# Patient Record
Sex: FEMALE | Race: WHITE | NOT HISPANIC OR LATINO | Employment: UNEMPLOYED | URBAN - NONMETROPOLITAN AREA
[De-identification: names, ages, dates, MRNs, and addresses within clinical notes are randomized per-mention and may not be internally consistent; named-entity substitution may affect disease eponyms.]

---

## 2017-02-09 ENCOUNTER — OFFICE VISIT (OUTPATIENT)
Dept: OTOLARYNGOLOGY | Facility: CLINIC | Age: 52
End: 2017-02-09

## 2017-02-09 ENCOUNTER — PROCEDURE VISIT (OUTPATIENT)
Dept: OTOLARYNGOLOGY | Facility: CLINIC | Age: 52
End: 2017-02-09

## 2017-02-09 VITALS
HEIGHT: 65 IN | WEIGHT: 128 LBS | HEART RATE: 76 BPM | TEMPERATURE: 97.9 F | BODY MASS INDEX: 21.33 KG/M2 | DIASTOLIC BLOOD PRESSURE: 68 MMHG | SYSTOLIC BLOOD PRESSURE: 124 MMHG

## 2017-02-09 DIAGNOSIS — H90.11 CONDUCTIVE HEARING LOSS IN RIGHT EAR: ICD-10-CM

## 2017-02-09 DIAGNOSIS — H72.01 CENTRAL PERFORATION OF TYMPANIC MEMBRANE, RIGHT EAR: Primary | ICD-10-CM

## 2017-02-09 DIAGNOSIS — H72.91 EAR DRUM PERFORATION, RIGHT: Primary | ICD-10-CM

## 2017-02-09 DIAGNOSIS — H90.5 SENSORINEURAL HEARING LOSS OF LEFT EAR: ICD-10-CM

## 2017-02-09 DIAGNOSIS — H92.11 OTORRHEA, RIGHT EAR: ICD-10-CM

## 2017-02-09 PROCEDURE — 99204 OFFICE O/P NEW MOD 45 MIN: CPT | Performed by: NURSE PRACTITIONER

## 2017-02-09 RX ORDER — CIPROFLOXACIN AND DEXAMETHASONE 3; 1 MG/ML; MG/ML
4 SUSPENSION/ DROPS AURICULAR (OTIC) 2 TIMES DAILY
Qty: 7.5 ML | Refills: 0 | Status: SHIPPED | OUTPATIENT
Start: 2017-02-09 | End: 2017-02-19

## 2017-02-09 RX ORDER — AMOXICILLIN AND CLAVULANATE POTASSIUM 875; 125 MG/1; MG/1
1 TABLET, FILM COATED ORAL 2 TIMES DAILY
Qty: 20 TABLET | Refills: 0 | Status: SHIPPED | OUTPATIENT
Start: 2017-02-09 | End: 2017-02-19

## 2017-02-09 NOTE — PROGRESS NOTES
PRIMARY CARE PROVIDER: Delfino Bennett MD  REFERRING PROVIDER: No ref. provider found    Chief Complaint   Patient presents with   • Ear Problem     Right ear drum perforation/infection       Subjective   History of Present Illness:  Claudette Ray is a  51 y.o.  female who complains of otorrhea, a current ear infection, an eardrum perforation and a history of ear surgery. The symptoms are localized to the right ear. The patient has had moderate symptoms. The symptoms have been relatively constant for the last several months . The symptoms are aggravated by  no identifiable factors . The symptoms are improved by no identifieable factors. The patient has a history of 3 tympanoplasty surgeries done in [place]. She states the surgeries have all failed. Today she complains of right ear pain, drainage, and decreased hearing. She was treated with antibiotic, steroid, and drops in a walk-in clinic a couple of months ago that gave her mild relief.      Review of Systems:  Review of Systems   Constitutional: Negative for chills and fever.   HENT:        See HPI   Eyes: Negative for redness and itching.   Respiratory: Negative for cough and shortness of breath.    Cardiovascular: Negative.    Gastrointestinal: Negative for nausea and vomiting.   Allergic/Immunologic: Negative.    Neurological: Positive for headaches. Negative for dizziness.   Hematological: Does not bruise/bleed easily.   Psychiatric/Behavioral: Negative.        Past History:  History reviewed. No pertinent past medical history.  Past Surgical History   Procedure Laterality Date   • Tympanoplasty Right      x3 1996/2004/2008   • Eye surgery     • Hernia repair     • Wrist surgery       Family History   Problem Relation Age of Onset   • Diabetes Mother    • Heart disease Father      Social History   Substance Use Topics   • Smoking status: Current Every Day Smoker     Packs/day: 0.50     Types: Cigarettes   • Smokeless tobacco: None   • Alcohol use 1.2 oz/week      2 Glasses of wine per week       Current Outpatient Prescriptions:   •  Multiple Vitamins-Minerals (MULTIVITAMIN PO), Take  by mouth., Disp: , Rfl:   •  amoxicillin-clavulanate (AUGMENTIN) 875-125 MG per tablet, Take 1 tablet by mouth 2 (Two) Times a Day for 10 days., Disp: 20 tablet, Rfl: 0  •  ciprofloxacin-dexamethasone (CIPRODEX) 0.3-0.1 % otic suspension, Administer 4 drops into ears 2 (Two) Times a Day for 10 days., Disp: 7.5 mL, Rfl: 0  Allergies:  Review of patient's allergies indicates no known allergies.    Objective     Vital Signs:  Temp:  [97.9 °F (36.6 °C)] 97.9 °F (36.6 °C)  Heart Rate:  [76] 76  BP: (124)/(68) 124/68    Physical Exam:  CONSTITUTIONAL: well nourished, well-developed, alert, oriented, in no acute distress   COMMUNICATION AND VOICE: able to communicate normally, normal voice quality  HEAD: normocephalic, no lesions, atraumatic, no tenderness, no masses   FACE: appearance normal, no lesions, no tenderness, no deformities, facial motion symmetric  SALIVARY GLANDS: parotid glands with no tenderness, no swelling, no masses, submandibular glands with normal size, nontender  EYES: ocular motility normal, eyelids normal, orbits normal, no proptosis, conjunctiva normal , pupils equal, round   EARS:  Hearing: response to conversational voice normal bilaterally   External Ears: auricles without lesions  Otoscopic Exam:   EXTERNAL CANAL: normal ear canal without stenosis or significant cerumen   RIGHT TYMPANIC MEMBRANE: large central perforation present with thick, white drainage present; there is mild erythema and inflammation posteriorly  LEFT TYMPANIC MEMBRANE:  tympanic membrane appearance normal, no lesions, no perforation, normal mobility, no fluid  NOSE:  External Nose: structure normal, no tenderness on palpation, clear nasal discharge, no lesions, no evidence of trauma, nostrils patent   Intranasal Exam: nasal mucosa normal, vestibule within normal limits, inferior turbinate normal,  nasal septum midline   ORAL:  Lips: upper and lower lips without lesion   Teeth: dentition within normal limits for age   Gums: gingivae healthy   Oral Mucosa: oral mucosa normal, no mucosal lesions   Floor of Mouth: Warthin’s duct patent, mucosa normal  Tongue: lingual mucosa normal without lesions, normal tongue mobility   Palate: soft and hard palates with normal mucosa and structure  Oropharynx: oropharyngeal mucosa normal, tonsils normal in appearance   NECK: neck appearance normal, no masses or tenderness  THYROID: no overt thyromegaly, no tenderness, nodules or mass present on palpation, position midline   LYMPH NODES: no lymphadenopathy  CHEST/RESPIRATORY: respiratory effort normal, normal breath sounds   CARDIOVASCULAR: rate and rhythm normal, extremities without cyanosis or edema    NEUROLOGIC/PSYCHIATRIC: oriented to time, place and person, mood normal, affect appropriate, CN II-XII intact grossly    Results Review:   Audio reviewed:                       Assessment   1. Central perforation of tympanic membrane, right ear    2. Conductive hearing loss in right ear    3. Sensorineural hearing loss of left ear    4. Otorrhea, right ear        Plan   Continue current management plan. We discussed that she may need another surgical procedure; however, at this time we will treat with drops to clear the drainage and have her return in 2-3 months with Dr. Francisco. Patient is to have records sent to our office.     -------MEDICATIONS:-------  New Medications Ordered This Visit   Medications   • amoxicillin-clavulanate (AUGMENTIN) 875-125 MG per tablet     Sig: Take 1 tablet by mouth 2 (Two) Times a Day for 10 days.     Dispense:  20 tablet     Refill:  0   • ciprofloxacin-dexamethasone (CIPRODEX) 0.3-0.1 % otic suspension     Sig: Administer 4 drops into ears 2 (Two) Times a Day for 10 days.     Dispense:  7.5 mL     Refill:  0           -----FOLLOW UP-----  Return in about 2 months (around 4/9/2017) for Recheck  ear with Dr. Francisco.    Marcela Ventura, APRN  02/09/17  1:02 PM

## 2017-04-13 ENCOUNTER — OFFICE VISIT (OUTPATIENT)
Dept: OTOLARYNGOLOGY | Facility: CLINIC | Age: 52
End: 2017-04-13

## 2017-04-13 VITALS
TEMPERATURE: 97.1 F | WEIGHT: 127 LBS | DIASTOLIC BLOOD PRESSURE: 75 MMHG | BODY MASS INDEX: 21.16 KG/M2 | HEART RATE: 65 BPM | SYSTOLIC BLOOD PRESSURE: 137 MMHG | HEIGHT: 65 IN

## 2017-04-13 DIAGNOSIS — H90.11 CONDUCTIVE HEARING LOSS IN RIGHT EAR: ICD-10-CM

## 2017-04-13 DIAGNOSIS — H72.01 CENTRAL PERFORATION OF TYMPANIC MEMBRANE, RIGHT EAR: Primary | ICD-10-CM

## 2017-04-13 DIAGNOSIS — H92.11 OTORRHEA, RIGHT EAR: ICD-10-CM

## 2017-04-13 PROCEDURE — 92504 EAR MICROSCOPY EXAMINATION: CPT | Performed by: OTOLARYNGOLOGY

## 2017-04-13 PROCEDURE — 99214 OFFICE O/P EST MOD 30 MIN: CPT | Performed by: OTOLARYNGOLOGY
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
## 2017-04-26 ENCOUNTER — APPOINTMENT (OUTPATIENT)
Dept: PREADMISSION TESTING | Facility: HOSPITAL | Age: 52
End: 2017-04-26

## 2017-05-26 ENCOUNTER — APPOINTMENT (OUTPATIENT)
Dept: PREADMISSION TESTING | Facility: HOSPITAL | Age: 52
End: 2017-05-26

## 2017-05-26 ENCOUNTER — HOSPITAL ENCOUNTER (OUTPATIENT)
Dept: GENERAL RADIOLOGY | Facility: HOSPITAL | Age: 52
Discharge: HOME | End: 2017-05-26
Admitting: OTOLARYNGOLOGY

## 2017-05-26 VITALS
HEART RATE: 77 BPM | WEIGHT: 127 LBS | BODY MASS INDEX: 21.16 KG/M2 | DIASTOLIC BLOOD PRESSURE: 65 MMHG | HEIGHT: 65 IN | OXYGEN SATURATION: 99 % | SYSTOLIC BLOOD PRESSURE: 119 MMHG | RESPIRATION RATE: 18 BRPM

## 2017-05-26 LAB
ALBUMIN SERPL-MCNC: 4.5 G/DL (ref 3.5–5)
ALBUMIN/GLOB SERPL: 1.6 G/DL (ref 1.1–2.5)
ALP SERPL-CCNC: 66 U/L (ref 24–120)
ALT SERPL W P-5'-P-CCNC: 60 U/L (ref 0–54)
ANION GAP SERPL CALCULATED.3IONS-SCNC: 12 MMOL/L (ref 4–13)
AST SERPL-CCNC: 49 U/L (ref 7–45)
BILIRUB SERPL-MCNC: 0.5 MG/DL (ref 0.1–1)
BUN BLD-MCNC: 10 MG/DL (ref 5–21)
BUN/CREAT SERPL: 14.7 (ref 7–25)
CALCIUM SPEC-SCNC: 9.5 MG/DL (ref 8.4–10.4)
CHLORIDE SERPL-SCNC: 102 MMOL/L (ref 98–110)
CO2 SERPL-SCNC: 27 MMOL/L (ref 24–31)
CREAT BLD-MCNC: 0.68 MG/DL (ref 0.5–1.4)
DEPRECATED RDW RBC AUTO: 44.3 FL (ref 40–54)
ERYTHROCYTE [DISTWIDTH] IN BLOOD BY AUTOMATED COUNT: 12.4 % (ref 12–15)
GFR SERPL CREATININE-BSD FRML MDRD: 91 ML/MIN/1.73
GLOBULIN UR ELPH-MCNC: 2.8 GM/DL
GLUCOSE BLD-MCNC: 100 MG/DL (ref 70–100)
HCT VFR BLD AUTO: 42.7 % (ref 37–47)
HGB BLD-MCNC: 14.4 G/DL (ref 12–16)
MCH RBC QN AUTO: 32.8 PG (ref 28–32)
MCHC RBC AUTO-ENTMCNC: 33.7 G/DL (ref 33–36)
MCV RBC AUTO: 97.3 FL (ref 82–98)
PLATELET # BLD AUTO: 311 10*3/MM3 (ref 130–400)
PMV BLD AUTO: 10.3 FL (ref 6–12)
POTASSIUM BLD-SCNC: 4.3 MMOL/L (ref 3.5–5.3)
PROT SERPL-MCNC: 7.3 G/DL (ref 6.3–8.7)
RBC # BLD AUTO: 4.39 10*6/MM3 (ref 4.2–5.4)
SODIUM BLD-SCNC: 141 MMOL/L (ref 135–145)
WBC NRBC COR # BLD: 6.01 10*3/MM3 (ref 4.8–10.8)

## 2017-05-26 PROCEDURE — 93005 ELECTROCARDIOGRAM TRACING: CPT

## 2017-05-26 PROCEDURE — 80053 COMPREHEN METABOLIC PANEL: CPT | Performed by: OTOLARYNGOLOGY

## 2017-05-26 PROCEDURE — 36415 COLL VENOUS BLD VENIPUNCTURE: CPT | Performed by: OTOLARYNGOLOGY

## 2017-05-26 PROCEDURE — 71010 HC CHEST PA OR AP: CPT

## 2017-05-26 PROCEDURE — 93010 ELECTROCARDIOGRAM REPORT: CPT | Performed by: INTERNAL MEDICINE

## 2017-05-26 PROCEDURE — 85027 COMPLETE CBC AUTOMATED: CPT | Performed by: OTOLARYNGOLOGY

## 2017-06-02 ENCOUNTER — ANESTHESIA (OUTPATIENT)
Dept: PERIOP | Facility: HOSPITAL | Age: 52
End: 2017-06-02

## 2017-06-02 ENCOUNTER — HOSPITAL ENCOUNTER (OUTPATIENT)
Facility: HOSPITAL | Age: 52
Setting detail: HOSPITAL OUTPATIENT SURGERY
Discharge: HOME | End: 2017-06-02
Attending: OTOLARYNGOLOGY | Admitting: OTOLARYNGOLOGY

## 2017-06-02 ENCOUNTER — ANESTHESIA EVENT (OUTPATIENT)
Dept: PERIOP | Facility: HOSPITAL | Age: 52
End: 2017-06-02

## 2017-06-02 VITALS
TEMPERATURE: 99.1 F | DIASTOLIC BLOOD PRESSURE: 61 MMHG | SYSTOLIC BLOOD PRESSURE: 108 MMHG | OXYGEN SATURATION: 96 % | RESPIRATION RATE: 16 BRPM | HEART RATE: 87 BPM

## 2017-06-02 DIAGNOSIS — H90.11 CONDUCTIVE HEARING LOSS IN RIGHT EAR: ICD-10-CM

## 2017-06-02 DIAGNOSIS — H92.11 OTORRHEA, RIGHT EAR: ICD-10-CM

## 2017-06-02 DIAGNOSIS — H72.01 CENTRAL PERFORATION OF TYMPANIC MEMBRANE, RIGHT EAR: ICD-10-CM

## 2017-06-02 PROCEDURE — 69450 EARDRUM REVISION: CPT | Performed by: OTOLARYNGOLOGY

## 2017-06-02 PROCEDURE — 25010000002 SUCCINYLCHOLINE PER 20 MG: Performed by: NURSE ANESTHETIST, CERTIFIED REGISTERED

## 2017-06-02 PROCEDURE — 25010000002 HYDROMORPHONE PER 4 MG: Performed by: ANESTHESIOLOGY

## 2017-06-02 PROCEDURE — 25010000002 MIDAZOLAM PER 1 MG: Performed by: ANESTHESIOLOGY

## 2017-06-02 PROCEDURE — 25010000002 EPINEPHRINE 1 MG/ML SOLUTION: Performed by: OTOLARYNGOLOGY

## 2017-06-02 PROCEDURE — 20926 PR REMV TISSUE FOR GRAFT OTHR: CPT | Performed by: OTOLARYNGOLOGY

## 2017-06-02 PROCEDURE — 25010000002 ONDANSETRON PER 1 MG: Performed by: NURSE ANESTHETIST, CERTIFIED REGISTERED

## 2017-06-02 PROCEDURE — 25010000002 ONDANSETRON PER 1 MG: Performed by: ANESTHESIOLOGY

## 2017-06-02 PROCEDURE — 25010000002 DEXAMETHASONE PER 1 MG: Performed by: NURSE ANESTHETIST, CERTIFIED REGISTERED

## 2017-06-02 PROCEDURE — 25010000002 PROPOFOL 10 MG/ML EMULSION: Performed by: NURSE ANESTHETIST, CERTIFIED REGISTERED

## 2017-06-02 PROCEDURE — 25010000002 DEXAMETHASONE PER 1 MG: Performed by: ANESTHESIOLOGY

## 2017-06-02 PROCEDURE — 69631 REPAIR EARDRUM STRUCTURES: CPT | Performed by: OTOLARYNGOLOGY

## 2017-06-02 RX ORDER — MEPERIDINE HYDROCHLORIDE 25 MG/ML
12.5 INJECTION INTRAMUSCULAR; INTRAVENOUS; SUBCUTANEOUS
Status: DISCONTINUED | OUTPATIENT
Start: 2017-06-02 | End: 2017-06-02 | Stop reason: HOSPADM

## 2017-06-02 RX ORDER — SUCCINYLCHOLINE CHLORIDE 20 MG/ML
INJECTION INTRAMUSCULAR; INTRAVENOUS AS NEEDED
Status: DISCONTINUED | OUTPATIENT
Start: 2017-06-02 | End: 2017-06-02 | Stop reason: SURG

## 2017-06-02 RX ORDER — BALANCED SALT SOLUTION 6.4; .75; .48; .3; 3.9; 1.7 MG/ML; MG/ML; MG/ML; MG/ML; MG/ML; MG/ML
SOLUTION OPHTHALMIC AS NEEDED
Status: DISCONTINUED | OUTPATIENT
Start: 2017-06-02 | End: 2017-06-02 | Stop reason: HOSPADM

## 2017-06-02 RX ORDER — LIDOCAINE HYDROCHLORIDE 20 MG/ML
INJECTION, SOLUTION INFILTRATION; PERINEURAL AS NEEDED
Status: DISCONTINUED | OUTPATIENT
Start: 2017-06-02 | End: 2017-06-02 | Stop reason: SURG

## 2017-06-02 RX ORDER — ONDANSETRON 2 MG/ML
INJECTION INTRAMUSCULAR; INTRAVENOUS AS NEEDED
Status: DISCONTINUED | OUTPATIENT
Start: 2017-06-02 | End: 2017-06-02 | Stop reason: SURG

## 2017-06-02 RX ORDER — SODIUM CHLORIDE, SODIUM LACTATE, POTASSIUM CHLORIDE, CALCIUM CHLORIDE 600; 310; 30; 20 MG/100ML; MG/100ML; MG/100ML; MG/100ML
9 INJECTION, SOLUTION INTRAVENOUS CONTINUOUS
Status: DISCONTINUED | OUTPATIENT
Start: 2017-06-02 | End: 2017-06-02 | Stop reason: HOSPADM

## 2017-06-02 RX ORDER — DEXAMETHASONE SODIUM PHOSPHATE 4 MG/ML
4 INJECTION, SOLUTION INTRA-ARTICULAR; INTRALESIONAL; INTRAMUSCULAR; INTRAVENOUS; SOFT TISSUE ONCE AS NEEDED
Status: COMPLETED | OUTPATIENT
Start: 2017-06-02 | End: 2017-06-02

## 2017-06-02 RX ORDER — HYDRALAZINE HYDROCHLORIDE 20 MG/ML
5 INJECTION INTRAMUSCULAR; INTRAVENOUS
Status: DISCONTINUED | OUTPATIENT
Start: 2017-06-02 | End: 2017-06-02 | Stop reason: HOSPADM

## 2017-06-02 RX ORDER — ONDANSETRON 2 MG/ML
4 INJECTION INTRAMUSCULAR; INTRAVENOUS ONCE AS NEEDED
Status: COMPLETED | OUTPATIENT
Start: 2017-06-02 | End: 2017-06-02

## 2017-06-02 RX ORDER — PROPOFOL 10 MG/ML
VIAL (ML) INTRAVENOUS AS NEEDED
Status: DISCONTINUED | OUTPATIENT
Start: 2017-06-02 | End: 2017-06-02 | Stop reason: SURG

## 2017-06-02 RX ORDER — MIDAZOLAM HYDROCHLORIDE 1 MG/ML
2 INJECTION INTRAMUSCULAR; INTRAVENOUS
Status: DISCONTINUED | OUTPATIENT
Start: 2017-06-02 | End: 2017-06-02 | Stop reason: HOSPADM

## 2017-06-02 RX ORDER — DEXAMETHASONE SODIUM PHOSPHATE 4 MG/ML
INJECTION, SOLUTION INTRA-ARTICULAR; INTRALESIONAL; INTRAMUSCULAR; INTRAVENOUS; SOFT TISSUE AS NEEDED
Status: DISCONTINUED | OUTPATIENT
Start: 2017-06-02 | End: 2017-06-02 | Stop reason: SURG

## 2017-06-02 RX ORDER — FENTANYL CITRATE 50 UG/ML
25 INJECTION, SOLUTION INTRAMUSCULAR; INTRAVENOUS
Status: DISCONTINUED | OUTPATIENT
Start: 2017-06-02 | End: 2017-06-02 | Stop reason: HOSPADM

## 2017-06-02 RX ORDER — OFLOXACIN 3 MG/ML
4 SOLUTION AURICULAR (OTIC) 2 TIMES DAILY
Qty: 10 ML | Refills: 0 | Status: SHIPPED | OUTPATIENT
Start: 2017-06-12 | End: 2017-07-31

## 2017-06-02 RX ORDER — BACITRACIN ZINC 500 [USP'U]/G
OINTMENT TOPICAL AS NEEDED
Status: DISCONTINUED | OUTPATIENT
Start: 2017-06-02 | End: 2017-06-02 | Stop reason: HOSPADM

## 2017-06-02 RX ORDER — LIDOCAINE HYDROCHLORIDE AND EPINEPHRINE 10; 10 MG/ML; UG/ML
INJECTION, SOLUTION INFILTRATION; PERINEURAL AS NEEDED
Status: DISCONTINUED | OUTPATIENT
Start: 2017-06-02 | End: 2017-06-02 | Stop reason: HOSPADM

## 2017-06-02 RX ORDER — SODIUM CHLORIDE 0.9 % (FLUSH) 0.9 %
1-10 SYRINGE (ML) INJECTION AS NEEDED
Status: DISCONTINUED | OUTPATIENT
Start: 2017-06-02 | End: 2017-06-02 | Stop reason: HOSPADM

## 2017-06-02 RX ORDER — EPINEPHRINE 1 MG/ML
INJECTION INTRAMUSCULAR; INTRAVENOUS; SUBCUTANEOUS AS NEEDED
Status: DISCONTINUED | OUTPATIENT
Start: 2017-06-02 | End: 2017-06-02 | Stop reason: HOSPADM

## 2017-06-02 RX ORDER — DIPHENHYDRAMINE HYDROCHLORIDE 50 MG/ML
12.5 INJECTION INTRAMUSCULAR; INTRAVENOUS
Status: DISCONTINUED | OUTPATIENT
Start: 2017-06-02 | End: 2017-06-02 | Stop reason: HOSPADM

## 2017-06-02 RX ORDER — MORPHINE SULFATE 2 MG/ML
2 INJECTION, SOLUTION INTRAMUSCULAR; INTRAVENOUS
Status: DISCONTINUED | OUTPATIENT
Start: 2017-06-02 | End: 2017-06-02 | Stop reason: HOSPADM

## 2017-06-02 RX ORDER — MIDAZOLAM HYDROCHLORIDE 1 MG/ML
1 INJECTION INTRAMUSCULAR; INTRAVENOUS
Status: DISCONTINUED | OUTPATIENT
Start: 2017-06-02 | End: 2017-06-02 | Stop reason: HOSPADM

## 2017-06-02 RX ORDER — MAGNESIUM HYDROXIDE 1200 MG/15ML
LIQUID ORAL AS NEEDED
Status: DISCONTINUED | OUTPATIENT
Start: 2017-06-02 | End: 2017-06-02 | Stop reason: HOSPADM

## 2017-06-02 RX ORDER — NALOXONE HCL 0.4 MG/ML
0.4 VIAL (ML) INJECTION AS NEEDED
Status: DISCONTINUED | OUTPATIENT
Start: 2017-06-02 | End: 2017-06-02 | Stop reason: HOSPADM

## 2017-06-02 RX ORDER — LABETALOL HYDROCHLORIDE 5 MG/ML
5 INJECTION, SOLUTION INTRAVENOUS
Status: DISCONTINUED | OUTPATIENT
Start: 2017-06-02 | End: 2017-06-02 | Stop reason: HOSPADM

## 2017-06-02 RX ORDER — DEXTROSE MONOHYDRATE 25 G/50ML
12.5 INJECTION, SOLUTION INTRAVENOUS AS NEEDED
Status: DISCONTINUED | OUTPATIENT
Start: 2017-06-02 | End: 2017-06-02 | Stop reason: HOSPADM

## 2017-06-02 RX ORDER — ROCURONIUM BROMIDE 10 MG/ML
INJECTION, SOLUTION INTRAVENOUS AS NEEDED
Status: DISCONTINUED | OUTPATIENT
Start: 2017-06-02 | End: 2017-06-02 | Stop reason: SURG

## 2017-06-02 RX ORDER — PHENYLEPHRINE HCL IN 0.9% NACL 0.8MG/10ML
SYRINGE (ML) INTRAVENOUS AS NEEDED
Status: DISCONTINUED | OUTPATIENT
Start: 2017-06-02 | End: 2017-06-02 | Stop reason: SURG

## 2017-06-02 RX ORDER — HYDROCODONE BITARTRATE AND ACETAMINOPHEN 5; 325 MG/1; MG/1
1 TABLET ORAL EVERY 6 HOURS PRN
Status: DISCONTINUED | OUTPATIENT
Start: 2017-06-02 | End: 2017-06-02 | Stop reason: HOSPADM

## 2017-06-02 RX ORDER — IPRATROPIUM BROMIDE AND ALBUTEROL SULFATE 2.5; .5 MG/3ML; MG/3ML
3 SOLUTION RESPIRATORY (INHALATION) ONCE AS NEEDED
Status: DISCONTINUED | OUTPATIENT
Start: 2017-06-02 | End: 2017-06-02 | Stop reason: HOSPADM

## 2017-06-02 RX ORDER — HYDROCODONE BITARTRATE AND ACETAMINOPHEN 5; 325 MG/1; MG/1
1 TABLET ORAL EVERY 4 HOURS PRN
Qty: 30 TABLET | Refills: 0 | Status: SHIPPED | OUTPATIENT
Start: 2017-06-02 | End: 2017-06-09

## 2017-06-02 RX ORDER — SUFENTANIL CITRATE 50 UG/ML
INJECTION EPIDURAL; INTRAVENOUS AS NEEDED
Status: DISCONTINUED | OUTPATIENT
Start: 2017-06-02 | End: 2017-06-02 | Stop reason: SURG

## 2017-06-02 RX ADMIN — SODIUM CHLORIDE, POTASSIUM CHLORIDE, SODIUM LACTATE AND CALCIUM CHLORIDE: 600; 310; 30; 20 INJECTION, SOLUTION INTRAVENOUS at 11:40

## 2017-06-02 RX ADMIN — LIDOCAINE HYDROCHLORIDE 1 ML: 10 INJECTION, SOLUTION EPIDURAL; INFILTRATION; INTRACAUDAL; PERINEURAL at 09:43

## 2017-06-02 RX ADMIN — MIDAZOLAM HYDROCHLORIDE 2 MG: 1 INJECTION, SOLUTION INTRAMUSCULAR; INTRAVENOUS at 10:16

## 2017-06-02 RX ADMIN — ONDANSETRON HYDROCHLORIDE 4 MG: 2 SOLUTION INTRAMUSCULAR; INTRAVENOUS at 12:38

## 2017-06-02 RX ADMIN — DEXAMETHASONE SODIUM PHOSPHATE 4 MG: 4 INJECTION, SOLUTION INTRAMUSCULAR; INTRAVENOUS at 09:44

## 2017-06-02 RX ADMIN — HYDROMORPHONE HYDROCHLORIDE 0.5 MG: 1 INJECTION, SOLUTION INTRAMUSCULAR; INTRAVENOUS; SUBCUTANEOUS at 13:15

## 2017-06-02 RX ADMIN — ROCURONIUM BROMIDE 5 MG: 10 INJECTION INTRAVENOUS at 10:46

## 2017-06-02 RX ADMIN — PROPOFOL 110 MG: 10 INJECTION, EMULSION INTRAVENOUS at 10:46

## 2017-06-02 RX ADMIN — SUCCINYLCHOLINE CHLORIDE 100 MG: 20 INJECTION, SOLUTION INTRAMUSCULAR; INTRAVENOUS at 10:46

## 2017-06-02 RX ADMIN — Medication 80 MCG: at 10:57

## 2017-06-02 RX ADMIN — HYDROMORPHONE HYDROCHLORIDE 0.5 MG: 1 INJECTION, SOLUTION INTRAMUSCULAR; INTRAVENOUS; SUBCUTANEOUS at 13:03

## 2017-06-02 RX ADMIN — SUFENTANIL CITRATE 50 MCG: 50 INJECTION, SOLUTION EPIDURAL; INTRAVENOUS at 10:46

## 2017-06-02 RX ADMIN — HYDROMORPHONE HYDROCHLORIDE 0.5 MG: 1 INJECTION, SOLUTION INTRAMUSCULAR; INTRAVENOUS; SUBCUTANEOUS at 13:21

## 2017-06-02 RX ADMIN — SODIUM CHLORIDE, POTASSIUM CHLORIDE, SODIUM LACTATE AND CALCIUM CHLORIDE 9 ML/HR: 600; 310; 30; 20 INJECTION, SOLUTION INTRAVENOUS at 13:21

## 2017-06-02 RX ADMIN — LIDOCAINE HYDROCHLORIDE 100 MG: 20 INJECTION, SOLUTION INFILTRATION; PERINEURAL at 10:46

## 2017-06-02 RX ADMIN — HYDROMORPHONE HYDROCHLORIDE 0.5 MG: 1 INJECTION, SOLUTION INTRAMUSCULAR; INTRAVENOUS; SUBCUTANEOUS at 13:08

## 2017-06-02 RX ADMIN — EPHEDRINE SULFATE 15 MG: 50 INJECTION INTRAMUSCULAR; INTRAVENOUS; SUBCUTANEOUS at 11:25

## 2017-06-02 RX ADMIN — Medication 160 MCG: at 11:21

## 2017-06-02 RX ADMIN — SODIUM CHLORIDE, POTASSIUM CHLORIDE, SODIUM LACTATE AND CALCIUM CHLORIDE 9 ML/HR: 600; 310; 30; 20 INJECTION, SOLUTION INTRAVENOUS at 09:43

## 2017-06-02 RX ADMIN — EPHEDRINE SULFATE 10 MG: 50 INJECTION INTRAMUSCULAR; INTRAVENOUS; SUBCUTANEOUS at 11:39

## 2017-06-02 RX ADMIN — ONDANSETRON 4 MG: 2 INJECTION INTRAMUSCULAR; INTRAVENOUS at 13:03

## 2017-06-02 RX ADMIN — DEXAMETHASONE SODIUM PHOSPHATE 4 MG: 4 INJECTION, SOLUTION INTRAMUSCULAR; INTRAVENOUS at 12:38

## 2017-06-02 RX ADMIN — Medication 80 MCG: at 11:08

## 2017-06-02 RX ADMIN — Medication 160 MCG: at 10:50

## 2017-06-02 NOTE — ANESTHESIA PREPROCEDURE EVALUATION
Anesthesia Evaluation     Patient summary reviewed   no history of anesthetic complications:  NPO Solid Status: > 8 hours       Airway   Mallampati: I  TM distance: >3 FB  Neck ROM: full  Dental      Pulmonary    (+) a smoker,   (-) COPD, asthma, sleep apnea  Cardiovascular - negative cardio ROS  Exercise tolerance: excellent (>7 METS)    ECG reviewed        Neuro/Psych  (-) seizures, CVA  GI/Hepatic/Renal/Endo    (-) GERD, liver disease, renal disease, diabetes    Musculoskeletal     Abdominal    Substance History      OB/GYN          Other                                        Anesthesia Plan    ASA 2     general     intravenous induction   Anesthetic plan and risks discussed with patient.

## 2017-06-02 NOTE — ANESTHESIA PROCEDURE NOTES
Airway  Urgency: elective    Airway not difficult    General Information and Staff    Patient location during procedure: OR  CRNA: ANTONIO BRIGGS    Indications and Patient Condition  Indications for airway management: airway protection    Preoxygenated: yes  Mask difficulty assessment: 1 - vent by mask    Final Airway Details  Final airway type: endotracheal airway      Successful airway: ETT  Cuffed: yes   Successful intubation technique: direct laryngoscopy  Facilitating devices/methods: intubating stylet  Endotracheal tube insertion site: oral  Blade: Kurtz  Blade size: #2  ETT size: 7.0 mm  Cormack-Lehane Classification: grade I - full view of glottis  Placement verified by: chest auscultation and capnometry   Cuff volume (mL): 8  Measured from: teeth  ETT to teeth (cm): 20  Number of attempts at approach: 1

## 2017-06-02 NOTE — OP NOTE
Operative Note    Claudette Ray  6/2/2017    Pre-op Diagnosis:   Conductive hearing loss in right ear [H90.11]  Central perforation of tympanic membrane, right ear [H72.01]  Otorrhea, right ear [H92.11]    Post-op Diagnosis:     Post-Op Diagnosis Codes:     * Conductive hearing loss in right ear [H90.11]     * Central perforation of tympanic membrane, right ear [H72.01]     * Otorrhea, right ear [H92.11]    Procedure/CPT® Codes:  TN TYMPANOPLASTY [88232]  TN EARDRUM REVISION [01369]  TN MICROSURG TECHNIQUES,REQ OPER MICROSCOPE [49651]    Procedure(s):  right tympanoplasty, tympanolysis of adhesions, use of the operative microscope, facial nerve monitoring, tragal cartilaginous grafting    Surgeon(s):  Delfino Francisco MD    Anesthesia: General    Staff:   Circulator: Chayo Chu RN  Scrub Person: Kushal Grimes  Other: Marybel Malik; Anny Schaeffer; Mary Ardon    Estimated Blood Loss:   minimal    Specimens:                None      Drains:   none    Findings:   There is a 40% perforation in the inferior aspect of the tympanic membrane.  It was adhesed down to the incudostapedial joint and the promontory superiorly.  There is no evidence of cholesteatoma.  There was mobility of the ossicular chain after removal of adhesions.    Complications:   none    Reason for the Operation:  Claudette Ray is a 51 y.o. female with a history of a right-sided tympanic membrane perforation.  She was found to have conductive hearing loss and has had chronic otorrhea.  She has had at least 2 or 3 separate ear surgeries on that ear in the past.  I felt that she would require a revision tympanoplasty with takedown of adhesions.  After understanding the risks, benefits and alternatives, a consent for the operation was given.     Procedure Description:  The patient was taken back to the operating room, placed supine on the operating table and placed under anesthesia by the anesthesia staff. Once this was done a time  out was performed to confirm the patient and the proper procedure. Once this was done, the table was turned 180° to facilitate acess the head.  The [employer] Nerve Integrity Monitor (NIM) was set up to monitor both the upper and lower divisions of the facial nerve by placing leads in the orbicularis occuli and orbicularis oris muscles.  Leads were placed into the subcutaneous muscle layer. Ground and reference leads were placed. The NIM was then tested and found to be in appropriate working order with separate tones for all divisions. After sterile prep, a handheld probe was attached and used for verifying nerve integrity when needed. The right ear was prepped and draped in the usual otologic fashion.  Mastisol and 10 x 10's were placed around the ear to protect the hair from the wound.  The areas were injected with 1% lidocaine with 1 100,000 parts adrenaline.  The patient was then prepped and draped in the standard fashion.  Using the 0° otologic telescope, the ear canal was visualized and cleaned of its cerumen.  The above-mentioned findings of the perforation were noted. The microscope was used to visualize the external auditory canal and vascular strip injections were performed.  Once this was done, a tragal perichondrial and cartilage graft was harvested by making an incision at the tip of the tragus and carrying it down to the posterior perichondrium.  Soft tissue was elevated off of it in the perichondrium was then harvested.  I then used a 4 mm punch to harvest cartilage from the tragal cartilage itself.  I then irrigated and closed the wound with 5-0 fast absorbing plain gut suture.  The perichondrium was spread on a Judson block for drying.  Once this was done, I went back under the microscope and raised a tympanomeatal flap making an incision approximately 3-4 mm distal to the annulus.  There is a lot of scarring in this area especially superiorly.  I entered in the middle ear spaced and used a gimmick to  elevate the annulus.  Superiorly, there was a lot of scarring at the head of the malleus.  There also was adhesions of the superior aspect of the perforation to the promontory and the incudostapedial joint region.  I found the chorda tympani nerve and maintained it as it coursed underneath the malleus.  I lifted the eardrum off of the malleus sharply with a sickle knife.  At the level of the inferior aspect of the stapes, I lifted the eardrum off and then took it off of the promontory.  I aired on removing some of the mucosa of the middle ear space to make sure that there was no squamous tissue that had grown onto it.  I then had the eardrum completely up.  I trimmed the perforation with a Bellucci scissors to remove both the atrophic portion of it and also the area that could be middle ear mucosa rather than squamous tissue.  After this was done I placed Gelfoam with 1-10,000 parts adrenaline in the middle ear space for hemostasis.  I removed this and then packed the middle ear space with Gelfoam with saline.  I then thinned out the cartilage and placed in the posterior inferior aspect of the eardrum region.  I then placed the fascial graft over top of this and made sure it was tucked underneath the tympanomeatal flap.  I returned the tympanomeatal flap to a normal position.  I packed Gelfoam on the outside.  I then placed bacitracin ointment in the ear canal.  The patient was then turned over to the anesthesia team and allowed to wake from anesthesia. The patient was transported to the recovery room in a stable condition.       Delfino Francisco MD     Date: 6/2/2017  Time: 2:23 PM

## 2017-06-02 NOTE — DISCHARGE INSTRUCTIONS

## 2017-06-02 NOTE — PLAN OF CARE
Problem: Patient Care Overview (Adult)  Goal: Plan of Care Review  Outcome: Outcome(s) achieved Date Met:  06/02/17 06/02/17 1442   Coping/Psychosocial Response Interventions   Plan Of Care Reviewed With patient;family   Patient Care Overview   Progress improving   Outcome Evaluation   Outcome Summary/Follow up Plan patient meets discharge criteria         Problem: Perioperative Period (Adult)  Goal: Signs and Symptoms of Listed Potential Problems Will be Absent or Manageable (Perioperative Period)  Outcome: Outcome(s) achieved Date Met:  06/02/17

## 2017-06-02 NOTE — H&P
PRIMARY CARE PROVIDER: Delfino Bennett MD  REFERRING PROVIDER: Delfino Francisco MD    CHIEF COMPLAINT:  Preoperative evaluation for surgery    Subjective   History of Present Illness:  Claudette Ray is a  51 y.o.  female who who is here for follow up. She is scheduled for right tympanoplasty. There has been no significant change in the history since the preoperative office evaluation.     Review of Systems:  CONSTITUTIONAL: no fever or chills  PULMONARY: no cough or shortness of breath  GI: no nausea or vomiting    Past History:  Past Medical History:   Diagnosis Date   • History of occasional ear infections      Past Surgical History:   Procedure Laterality Date   • EYE SURGERY     • EYE SURGERY Bilateral     X2   • HERNIA REPAIR     • TYMPANOPLASTY Right     x3 1996/2004/2008   • WRIST SURGERY       Family History   Problem Relation Age of Onset   • Diabetes Mother    • Heart disease Father      Social History   Substance Use Topics   • Smoking status: Current Every Day Smoker     Packs/day: 0.50     Types: Cigarettes   • Smokeless tobacco: None   • Alcohol use 1.2 oz/week     2 Glasses of wine per week       Current Facility-Administered Medications:   •  dextrose (D50W) solution 12.5 g, 12.5 g, Intravenous, PRN, Marcio Kong MD  •  fentaNYL citrate (PF) (SUBLIMAZE) injection 25 mcg, 25 mcg, Intravenous, Q5 Min PRN, Marcio Kong MD  •  lactated ringers infusion, 9 mL/hr, Intravenous, Continuous, Marcio Kong MD, Last Rate: 9 mL/hr at 06/02/17 0943, 9 mL/hr at 06/02/17 0943  •  midazolam (VERSED) injection 1 mg, 1 mg, Intravenous, Q5 Min PRN **OR** midazolam (VERSED) injection 2 mg, 2 mg, Intravenous, Q5 Min PRN, Marcio Kong MD, 2 mg at 06/02/17 0944  •  sodium chloride 0.9 % flush 1-10 mL, 1-10 mL, Intravenous, PRN, Marcio Kong MD  Allergies:  Review of patient's allergies indicates no known allergies.    Objective     Vital Signs:  Temp:   [97.7 °F (36.5 °C)] 97.7 °F (36.5 °C)  Heart Rate:  [76] 76  Resp:  [16] 16  BP: (116)/(66) 116/66    Physical Exam:  CONSTITUTIONAL: well nourished, well-developed, alert, oriented, in no acute distress   COMMUNICATION AND VOICE: able to communicate normally, normal voice quality  HEAD: normocephalic, no lesions, atraumatic, no tenderness, no masses   FACE: appearance normal, no lesions, no tenderness, no deformities, facial motion symmetric  EYES: ocular motility normal, eyelids normal, orbits normal, no proptosis, conjunctiva normal , pupils equal, round   EARS:  Hearing: hearing to conversational voice intact bilaterally   External Ears: normal bilaterally, no lesions  NOSE:  External Nose: external nasal structure normal, no tenderness on palpation, no nasal discharge, no lesions, no evidence of trauma, nostrils patent   ORAL:  Lips: upper and lower lips without lesion   NECK:  Inspection and Palpation: neck appearance normal, no masses or tenderness  CHEST/RESPIRATORY: normal respiratory effort   CARDIOVASCULAR: no cyanosis or edema   NEUROLOGICAL/PSYCHIATRIC: oriented to time, place and person, mood normal, affect appropriate, CN II-XII intact grossly      Assessment   ASSESSMENT:  1. Central perforation of tympanic membrane, right ear    2. Conductive hearing loss in right ear    3. Otorrhea, right ear        Plan   PLAN:  right tympanoplasty  The risks and benefits have been re-discussed and questions answered    Delfino Francisco MD  06/02/17  10:05 AM

## 2017-06-02 NOTE — ANESTHESIA POSTPROCEDURE EVALUATION
Patient: Claudette Ray    Procedure Summary     Date Anesthesia Start Anesthesia Stop Room / Location    06/02/17 1044 1252  [facility] OR 02 / [facility] OR       Procedure Diagnosis Surgeon Provider    right tympanoplasty, tympanolysis of adhesions, use of the operative microscope, facial nerve monitoring, tragal cartilaginous grafting (Right Ear) Conductive hearing loss in right ear; Central perforation of tympanic membrane, right ear; Otorrhea, right ear  (Conductive hearing loss in right ear [H90.11]; Central perforation of tympanic membrane, right ear [H72.01]; Otorrhea, right ear [H92.11]) MD Akira Hines CRNA          Anesthesia Type: general  Last vitals  BP      Temp      Pulse     Resp      SpO2        Post Anesthesia Care and Evaluation      Comments: Patient discharged from PACU per protocol, VSS.     Blood pressure 108/61, pulse 87, temperature 99.1 °F (37.3 °C), resp. rate 16, SpO2 96 %.

## 2017-06-02 NOTE — PLAN OF CARE
Problem: Patient Care Overview (Adult)  Goal: Plan of Care Review  Outcome: Ongoing (interventions implemented as appropriate)    06/02/17 0936   Coping/Psychosocial Response Interventions   Plan Of Care Reviewed With patient   Patient Care Overview   Progress no change         Problem: Perioperative Period (Adult)  Goal: Signs and Symptoms of Listed Potential Problems Will be Absent or Manageable (Perioperative Period)  Outcome: Ongoing (interventions implemented as appropriate)    06/02/17 0936   Perioperative Period   Problems Assessed (Perioperative Period) hypothermia;physiologic stress response;situational response   Problems Present (Perioperative Period) none

## 2017-06-29 ENCOUNTER — OFFICE VISIT (OUTPATIENT)
Dept: OTOLARYNGOLOGY | Facility: CLINIC | Age: 52
End: 2017-06-29

## 2017-06-29 VITALS
RESPIRATION RATE: 20 BRPM | SYSTOLIC BLOOD PRESSURE: 115 MMHG | TEMPERATURE: 98 F | HEART RATE: 84 BPM | DIASTOLIC BLOOD PRESSURE: 65 MMHG | BODY MASS INDEX: 19.16 KG/M2 | WEIGHT: 115 LBS | HEIGHT: 65 IN

## 2017-06-29 DIAGNOSIS — H90.5 SENSORINEURAL HEARING LOSS OF LEFT EAR: ICD-10-CM

## 2017-06-29 DIAGNOSIS — Z72.0 TOBACCO ABUSE DISORDER: ICD-10-CM

## 2017-06-29 DIAGNOSIS — Z98.890 S/P TYMPANOPLASTY: Primary | ICD-10-CM

## 2017-06-29 DIAGNOSIS — H90.11 CONDUCTIVE HEARING LOSS IN RIGHT EAR: ICD-10-CM

## 2017-06-29 PROCEDURE — 99024 POSTOP FOLLOW-UP VISIT: CPT | Performed by: NURSE PRACTITIONER

## 2017-06-29 NOTE — PROGRESS NOTES
PRIMARY CARE PROVIDER: Delfino Bennett MD  REFERRING PROVIDER: No ref. provider found    Chief Complaint   Patient presents with   • Post-op     RIGHT TYMPANOPLASTY       Subjective   History of Present Illness:  Claudette Ray is a  51 y.o. female  who presents for follow up s/p right tympanoplasty, tympanolysis of adhesions, tragal cartilaginous grafting on June 2, 2017. The patient has had a relatively normal postoperative course and currently has no related complaints. She denies otalgia, otorrhea, or decreased hearing.     Review of Systems:  Review of Systems   Constitutional: Negative for chills and fever.   HENT: Negative for congestion, ear discharge, ear pain, postnasal drip, rhinorrhea, sinus pressure and sore throat.    All other systems reviewed and are negative.      Past History:  Past Medical History:   Diagnosis Date   • History of occasional ear infections      Past Surgical History:   Procedure Laterality Date   • EYE SURGERY     • EYE SURGERY Bilateral     X2   • HERNIA REPAIR     • TYMPANOPLASTY Right     x3 1996/2004/2008   • TYMPANOPLASTY Right 6/2/2017    Procedure: right tympanoplasty, tympanolysis of adhesions, use of the operative microscope, facial nerve monitoring, tragal cartilaginous grafting;  Surgeon: Delfino Francisco MD;  Location: [facility] OR;  Service:    • WRIST SURGERY       Family History   Problem Relation Age of Onset   • Diabetes Mother    • Heart disease Father      Social History   Substance Use Topics   • Smoking status: Current Every Day Smoker     Packs/day: 0.50     Types: Cigarettes   • Smokeless tobacco: None   • Alcohol use 1.2 oz/week     2 Glasses of wine per week     Allergies:  Review of patient's allergies indicates no known allergies.    Current Outpatient Prescriptions:   •  Multiple Vitamins-Minerals (MULTIVITAMIN PO), Take  by mouth., Disp: , Rfl:   •  ofloxacin (FLOXIN) 0.3 % otic solution, Administer 4 drops into ears 2 (Two) Times a Day., Disp: 10 mL,  Rfl: 0      Objective     Vital Signs:  Temp:  [98 °F (36.7 °C)] 98 °F (36.7 °C)  Heart Rate:  [84] 84  Resp:  [20] 20  BP: (115)/(65) 115/65    Physical Exam:  Physical Exam  CONSTITUTIONAL: well nourished, well-developed, alert, oriented, in no acute distress   COMMUNICATION AND VOICE: able to communicate normally, normal voice quality  HEAD: normocephalic, no lesions, atraumatic, no tenderness, no masses   FACE: appearance normal, no lesions, no tenderness, no deformities, facial motion symmetric  EYES: ocular motility normal, eyelids normal, orbits normal, no proptosis, conjunctiva normal , pupils equal, round   EARS:  Hearing: hearing to conversational voice intact bilaterally   External Ears: normal bilaterally, no lesions, incision well healed  EXTERNAL EAR CANALS: normal ear canals without stenosis or significant cerumen  RIGHT TYMPANIC MEMBRANE: no lesions present, no perforation present, normal postoperative appearance present, cartilaginous grafting present  LEFT TYMPANIC MEMBRANE: tympanic membrane appearance normal, no lesions, no perforation, normal mobility, no fluid  NOSE:  External Nose: external nasal structure normal, no tenderness on palpation, no nasal discharge, no lesions, no evidence of trauma, nostrils patent   ORAL:  Lips: upper and lower lips without lesion   NECK:  Inspection and Palpation: neck appearance normal, no masses or tenderness  CHEST/RESPIRATORY: normal respiratory effort   CARDIOVASCULAR: no cyanosis or edema   NEUROLOGICAL/PSYCHIATRIC: oriented to time, place and person, mood normal, affect appropriate, CN II-XII intact grossly      Assessment   Assessment:  1. S/P tympanoplasty    2. Conductive hearing loss in right ear    3. Sensorineural hearing loss of left ear    4. Tobacco abuse disorder        Plan   Plan:    Dry ear precautions. Call for ear drainage, ear pain, fever over 101, or hearing loss. Call for problems or worsening symptoms.     The patient was counseled  about the effects of smoking on the patients overall health. Risks of worsening of health, increased risks of cancer and poor surgical outcomes were discussed.     Return in about 4 weeks (around 7/27/2017), or if symptoms worsen or fail to improve, for Recheck, With Audio.    My findings and recommendations were discussed and questions were answered.     Christiana Wolf, LAURA  06/29/17  1:58 PM

## 2017-07-24 ENCOUNTER — LAB (OUTPATIENT)
Dept: LAB | Facility: HOSPITAL | Age: 52
End: 2017-07-24

## 2017-07-24 ENCOUNTER — TRANSCRIBE ORDERS (OUTPATIENT)
Dept: GENERAL RADIOLOGY | Facility: HOSPITAL | Age: 52
End: 2017-07-24

## 2017-07-24 DIAGNOSIS — Z11.3 SCREENING EXAMINATION FOR VENEREAL DISEASE: Primary | ICD-10-CM

## 2017-07-24 DIAGNOSIS — Z11.3 SCREENING EXAMINATION FOR VENEREAL DISEASE: ICD-10-CM

## 2017-07-24 LAB
HAV IGM SERPL QL IA: NEGATIVE
HBV CORE IGM SERPL QL IA: NEGATIVE
HBV SURFACE AG SERPL QL IA: NEGATIVE
HCV AB SER DONR QL: NEGATIVE
HCV S/C RATIO: 0.01 (ref 0–0.99)
HIV1+2 AB SER QL: NEGATIVE

## 2017-07-24 PROCEDURE — 86592 SYPHILIS TEST NON-TREP QUAL: CPT | Performed by: NURSE PRACTITIONER

## 2017-07-24 PROCEDURE — 87491 CHLMYD TRACH DNA AMP PROBE: CPT | Performed by: NURSE PRACTITIONER

## 2017-07-24 PROCEDURE — 80074 ACUTE HEPATITIS PANEL: CPT | Performed by: NURSE PRACTITIONER

## 2017-07-24 PROCEDURE — 86696 HERPES SIMPLEX TYPE 2 TEST: CPT | Performed by: NURSE PRACTITIONER

## 2017-07-24 PROCEDURE — 86695 HERPES SIMPLEX TYPE 1 TEST: CPT | Performed by: NURSE PRACTITIONER

## 2017-07-24 PROCEDURE — 36415 COLL VENOUS BLD VENIPUNCTURE: CPT

## 2017-07-24 PROCEDURE — G0432 EIA HIV-1/HIV-2 SCREEN: HCPCS | Performed by: NURSE PRACTITIONER

## 2017-07-24 PROCEDURE — 87389 HIV-1 AG W/HIV-1&-2 AB AG IA: CPT | Performed by: NURSE PRACTITIONER

## 2017-07-24 PROCEDURE — 87591 N.GONORRHOEAE DNA AMP PROB: CPT | Performed by: NURSE PRACTITIONER

## 2017-07-25 LAB
C TRACH RRNA SPEC DONR QL NAA+PROBE: NEGATIVE
HIV 1+2 AB+HIV1 P24 AG SERPL QL IA: NON REACTIVE
HSV1 IGG SER IA-ACNC: <0.91 INDEX (ref 0–0.9)
HSV2 IGG SER IA-ACNC: 10.9 INDEX (ref 0–0.9)
N GONORRHOEA DNA SPEC QL NAA+PROBE: NEGATIVE
RPR SER QL: NON REACTIVE

## 2017-07-31 ENCOUNTER — PROCEDURE VISIT (OUTPATIENT)
Dept: OTOLARYNGOLOGY | Facility: CLINIC | Age: 52
End: 2017-07-31

## 2017-07-31 ENCOUNTER — OFFICE VISIT (OUTPATIENT)
Dept: OTOLARYNGOLOGY | Facility: CLINIC | Age: 52
End: 2017-07-31

## 2017-07-31 VITALS
SYSTOLIC BLOOD PRESSURE: 110 MMHG | BODY MASS INDEX: 20.37 KG/M2 | HEART RATE: 82 BPM | WEIGHT: 122.25 LBS | DIASTOLIC BLOOD PRESSURE: 70 MMHG | HEIGHT: 65 IN | TEMPERATURE: 96.8 F

## 2017-07-31 DIAGNOSIS — H90.3 SENSORINEURAL HEARING LOSS (SNHL) OF BOTH EARS: ICD-10-CM

## 2017-07-31 DIAGNOSIS — H90.11 CONDUCTIVE HEARING LOSS IN RIGHT EAR: Primary | ICD-10-CM

## 2017-07-31 PROCEDURE — 99024 POSTOP FOLLOW-UP VISIT: CPT | Performed by: OTOLARYNGOLOGY

## 2017-07-31 NOTE — PROGRESS NOTES
Patient Care Team:  Delfino Bennett MD as PCP - General (Pediatrics)  Delfino Francisco MD as Consulting Physician (Otolaryngology)  LAURA Wills as Nurse Practitioner (Otolaryngology)  LAURA Downs as Nurse Practitioner (Otolaryngology)    Chief Complaint   Patient presents with   • Follow-up     Central perforation of tympanic membrane, right ear       Subjective   HPI   Claudette Ray is a  51 y.o.  female who presents for follow up s/p left tympanoplasty recently. The patient has had a relatively normal postoperative course and currently has no related complaints.    Review of Systems   Review of Systems    Past History:  Past Medical History:   Diagnosis Date   • History of occasional ear infections      Past Surgical History:   Procedure Laterality Date   • EYE SURGERY     • EYE SURGERY Bilateral     X2   • HERNIA REPAIR     • TYMPANOPLASTY Right     x3 1996/2004/2008   • TYMPANOPLASTY Right 6/2/2017    Procedure: right tympanoplasty, tympanolysis of adhesions, use of the operative microscope, facial nerve monitoring, tragal cartilaginous grafting;  Surgeon: Delfino Francisco MD;  Location: [facility] OR;  Service:    • WRIST SURGERY       Family History   Problem Relation Age of Onset   • Diabetes Mother    • Heart disease Father      Social History   Substance Use Topics   • Smoking status: Current Every Day Smoker     Packs/day: 0.50     Types: Cigarettes   • Smokeless tobacco: Never Used   • Alcohol use 1.2 oz/week     2 Glasses of wine per week       Current Outpatient Prescriptions:   •  Multiple Vitamins-Minerals (MULTIVITAMIN PO), Take  by mouth., Disp: , Rfl:   Allergies:  Review of patient's allergies indicates no known allergies.    Objective     Vital Signs:  Temp:  [96.8 °F (36 °C)] 96.8 °F (36 °C)  Heart Rate:  [82] 82  BP: (110)/(70) 110/70    Physical Exam  CONSTITUTIONAL: well nourished, well-developed, alert, oriented, in no acute distress  COMMUNICATION AND VOICE: able  to communicate normally for age, normal voice quality  HEAD: normocephalic, no lesions, atraumatic, no tenderness, no masses  FACE: appearance normal, no lesions, no tenderness, no deformities, facial motion symmetric  EYES: ocular motility normal, eyelids normal, orbits normal, no proptosis, conjunctiva normal , pupils equal, round  EARS:  Hearing: response to conversational voice normal bilaterally  External Ears: auricles without lesions  EXTERNAL EAR CANALS: normal ear canals without stenosis or significant cerumen  RIGHT TYMPANIC MEMBRANE: tympanic membrane appearance normal, no lesions, no perforation, normal mobility, no fluid  LEFT TYMPANIC MEMBRANE: no lesions present, no perforation present normal postoperative appearance present, cartilaginous grafting present  NOSE:  External Nose: structure normal, no tenderness on palpation, no nasal discharge, no lesions, no evidence of trauma, nostrils patent  ORAL:  Lips: upper and lower lips without lesion  NECK: neck appearance normal  CHEST/RESPIRATORY: respiratory effort normal, normal chest excursion  CARDIOVASCULAR: extremities without cyanosis or edema  NEUROLOGIC/PSYCHIATRIC: oriented appropriately, mood normal, affect appropriate, CN II-XII intact grossly    Her audiogram was reviewed with a improved conductive hearing loss on the right side, there is a mild bilateral sensorineural hearing loss    Assessment   1. Conductive hearing loss in right ear    2. Sensorineural hearing loss (SNHL) of both ears        Plan   Conservative management.  She may gain a little bit more improvement of her conductive component on the right side as it continues to heal.  However, she given the cartilaginous grafting in the amount of scarring that she had, she may continue to have a conductive loss.  If this bothers her she would be a candidate for hearing amplification.    Return in about 6 months (around 1/31/2018) for follow up with audiogram.    Delfino Francisco,  MD  07/31/17  4:44 PM

## 2017-07-31 NOTE — PROGRESS NOTES
Patient Intake Note    Review of Systems  Review of Systems   Constitutional: Negative for chills, fatigue and fever.   HENT:        See HPI   Respiratory: Negative for cough, choking, shortness of breath and wheezing.    Cardiovascular: Negative.    Gastrointestinal: Negative for constipation, diarrhea, nausea and vomiting.   Allergic/Immunologic: Negative for environmental allergies and food allergies.   Neurological: Negative for dizziness, light-headedness, numbness and headaches.   Hematological: Does not bruise/bleed easily.   Psychiatric/Behavioral: Positive for sleep disturbance.         Marie Agustin  7/31/2017  3:23 PM

## 2017-12-18 ENCOUNTER — TRANSCRIBE ORDERS (OUTPATIENT)
Dept: LAB | Facility: HOSPITAL | Age: 52
End: 2017-12-18

## 2017-12-18 ENCOUNTER — HOSPITAL ENCOUNTER (OUTPATIENT)
Dept: GENERAL RADIOLOGY | Facility: HOSPITAL | Age: 52
Discharge: HOME | End: 2017-12-18
Admitting: NURSE PRACTITIONER

## 2017-12-18 DIAGNOSIS — M54.2 CERVICALGIA: ICD-10-CM

## 2017-12-18 DIAGNOSIS — M54.2 CERVICALGIA: Primary | ICD-10-CM

## 2017-12-18 PROCEDURE — 72050 X-RAY EXAM NECK SPINE 4/5VWS: CPT

## 2017-12-22 ENCOUNTER — TRANSCRIBE ORDERS (OUTPATIENT)
Dept: ADMINISTRATIVE | Facility: HOSPITAL | Age: 52
End: 2017-12-22

## 2017-12-22 DIAGNOSIS — M47.12 CERVICAL SPONDYLOSIS WITH MYELOPATHY: Primary | ICD-10-CM

## 2017-12-29 ENCOUNTER — HOSPITAL ENCOUNTER (OUTPATIENT)
Dept: MRI IMAGING | Facility: HOSPITAL | Age: 52
Discharge: HOME | End: 2017-12-29
Admitting: NURSE PRACTITIONER

## 2017-12-29 DIAGNOSIS — M47.12 CERVICAL SPONDYLOSIS WITH MYELOPATHY: ICD-10-CM

## 2017-12-29 PROCEDURE — 72141 MRI NECK SPINE W/O DYE: CPT

## 2018-01-17 ENCOUNTER — OFFICE VISIT (OUTPATIENT)
Dept: NEUROSURGERY | Facility: CLINIC | Age: 53
End: 2018-01-17

## 2018-01-17 VITALS
BODY MASS INDEX: 19.99 KG/M2 | WEIGHT: 120 LBS | DIASTOLIC BLOOD PRESSURE: 86 MMHG | SYSTOLIC BLOOD PRESSURE: 126 MMHG | HEIGHT: 65 IN

## 2018-01-17 DIAGNOSIS — R29.898 WEAKNESS OF RIGHT ARM: ICD-10-CM

## 2018-01-17 DIAGNOSIS — Z72.0 TOBACCO ABUSE DISORDER: ICD-10-CM

## 2018-01-17 DIAGNOSIS — G44.329 CHRONIC POST-TRAUMATIC HEADACHE, NOT INTRACTABLE: ICD-10-CM

## 2018-01-17 DIAGNOSIS — M54.2 NECK PAIN, CHRONIC: Primary | ICD-10-CM

## 2018-01-17 DIAGNOSIS — G89.29 NECK PAIN, CHRONIC: Primary | ICD-10-CM

## 2018-01-17 DIAGNOSIS — M79.601 PAIN OF RIGHT UPPER EXTREMITY: ICD-10-CM

## 2018-01-17 PROCEDURE — 99204 OFFICE O/P NEW MOD 45 MIN: CPT | Performed by: NURSE PRACTITIONER

## 2018-01-17 RX ORDER — CYCLOBENZAPRINE HCL 10 MG
10 TABLET ORAL 3 TIMES DAILY PRN
Qty: 90 TABLET | Refills: 0 | Status: SHIPPED | OUTPATIENT
Start: 2018-01-17 | End: 2018-08-06

## 2018-01-17 RX ORDER — TRAMADOL HYDROCHLORIDE 50 MG/1
50 TABLET ORAL EVERY 8 HOURS PRN
Qty: 60 TABLET | Refills: 0 | Status: SHIPPED | OUTPATIENT
Start: 2018-01-17 | End: 2018-08-06

## 2018-01-17 RX ORDER — AMITRIPTYLINE HYDROCHLORIDE 10 MG/1
TABLET, FILM COATED ORAL
COMMUNITY
Start: 2018-01-09 | End: 2018-08-06

## 2018-01-17 RX ORDER — METHYLPREDNISOLONE 4 MG/1
TABLET ORAL
Qty: 1 EACH | Refills: 0 | Status: SHIPPED | OUTPATIENT
Start: 2018-01-17 | End: 2018-01-31

## 2018-01-17 RX ORDER — CITALOPRAM 40 MG/1
TABLET ORAL
COMMUNITY
Start: 2018-01-09 | End: 2018-08-06

## 2018-01-17 NOTE — PATIENT INSTRUCTIONS
Steps to Quit Smoking  Smoking tobacco can be harmful to your health and can affect almost every organ in your body. Smoking puts you, and those around you, at risk for developing many serious chronic diseases. Quitting smoking is difficult, but it is one of the best things that you can do for your health. It is never too late to quit.  What are the benefits of quitting smoking?  When you quit smoking, you lower your risk of developing serious diseases and conditions, such as:  · Lung cancer or lung disease, such as COPD.  · Heart disease.  · Stroke.  · Heart attack.  · Infertility.  · Osteoporosis and bone fractures.  Additionally, symptoms such as coughing, wheezing, and shortness of breath may get better when you quit. You may also find that you get sick less often because your body is stronger at fighting off colds and infections. If you are pregnant, quitting smoking can help to reduce your chances of having a baby of low birth weight.  How do I get ready to quit?  When you decide to quit smoking, create a plan to make sure that you are successful. Before you quit:  · Pick a date to quit. Set a date within the next two weeks to give you time to prepare.  · Write down the reasons why you are quitting. Keep this list in places where you will see it often, such as on your bathroom mirror or in your car or wallet.  · Identify the people, places, things, and activities that make you want to smoke (triggers) and avoid them. Make sure to take these actions:  ¨ Throw away all cigarettes at home, at work, and in your car.  ¨ Throw away smoking accessories, such as ashtrays and lighters.  ¨ Clean your car and make sure to empty the ashtray.  ¨ Clean your home, including curtains and carpets.  · Tell your family, friends, and coworkers that you are quitting. Support from your loved ones can make quitting easier.  · Talk with your health care provider about your options for quitting smoking.  · Find out what treatment  options are covered by your health insurance.  What strategies can I use to quit smoking?  Talk with your healthcare provider about different strategies to quit smoking. Some strategies include:  · Quitting smoking altogether instead of gradually lessening how much you smoke over a period of time. Research shows that quitting “cold turkey” is more successful than gradually quitting.  · Attending in-person counseling to help you build problem-solving skills. You are more likely to have success in quitting if you attend several counseling sessions. Even short sessions of 10 minutes can be effective.  · Finding resources and support systems that can help you to quit smoking and remain smoke-free after you quit. These resources are most helpful when you use them often. They can include:  ¨ Online chats with a counselor.  ¨ Telephone quitlines.  ¨ Printed self-help materials.  ¨ Support groups or group counseling.  ¨ Text messaging programs.  ¨ Mobile phone applications.  · Taking medicines to help you quit smoking. (If you are pregnant or breastfeeding, talk with your health care provider first.) Some medicines contain nicotine and some do not. Both types of medicines help with cravings, but the medicines that include nicotine help to relieve withdrawal symptoms. Your health care provider may recommend:  ¨ Nicotine patches, gum, or lozenges.  ¨ Nicotine inhalers or sprays.  ¨ Non-nicotine medicine that is taken by mouth.  Talk with your health care provider about combining strategies, such as taking medicines while you are also receiving in-person counseling. Using these two strategies together makes you more likely to succeed in quitting than if you used either strategy on its own.  If you are pregnant or breastfeeding, talk with your health care provider about finding counseling or other support strategies to quit smoking. Do not take medicine to help you quit smoking unless told to do so by your health care  provider.  What things can I do to make it easier to quit?  Quitting smoking might feel overwhelming at first, but there is a lot that you can do to make it easier. Take these important actions:  · Reach out to your family and friends and ask that they support and encourage you during this time. Call telephone quitlines, reach out to support groups, or work with a counselor for support.  · Ask people who smoke to avoid smoking around you.  · Avoid places that trigger you to smoke, such as bars, parties, or smoke-break areas at work.  · Spend time around people who do not smoke.  · Lessen stress in your life, because stress can be a smoking trigger for some people. To lessen stress, try:  ¨ Exercising regularly.  ¨ Deep-breathing exercises.  ¨ Yoga.  ¨ Meditating.  ¨ Performing a body scan. This involves closing your eyes, scanning your body from head to toe, and noticing which parts of your body are particularly tense. Purposefully relax the muscles in those areas.  · Download or purchase mobile phone or tablet apps (applications) that can help you stick to your quit plan by providing reminders, tips, and encouragement. There are many free apps, such as QuitGuide from the CDC (Centers for Disease Control and Prevention). You can find other support for quitting smoking (smoking cessation) through smokefree.gov and other websites.  How will I feel when I quit smoking?  Within the first 24 hours of quitting smoking, you may start to feel some withdrawal symptoms. These symptoms are usually most noticeable 2-3 days after quitting, but they usually do not last beyond 2-3 weeks. Changes or symptoms that you might experience include:  · Mood swings.  · Restlessness, anxiety, or irritation.  · Difficulty concentrating.  · Dizziness.  · Strong cravings for sugary foods in addition to nicotine.  · Mild weight gain.  · Constipation.  · Nausea.  · Coughing or a sore throat.  · Changes in how your medicines work in your  body.  · A depressed mood.  · Difficulty sleeping (insomnia).  After the first 2-3 weeks of quitting, you may start to notice more positive results, such as:  · Improved sense of smell and taste.  · Decreased coughing and sore throat.  · Slower heart rate.  · Lower blood pressure.  · Clearer skin.  · The ability to breathe more easily.  · Fewer sick days.  Quitting smoking is very challenging for most people. Do not get discouraged if you are not successful the first time. Some people need to make many attempts to quit before they achieve long-term success. Do your best to stick to your quit plan, and talk with your health care provider if you have any questions or concerns.  This information is not intended to replace advice given to you by your health care provider. Make sure you discuss any questions you have with your health care provider.  Document Released: 12/12/2002 Document Revised: 08/15/2017 Document Reviewed: 05/03/2016  [employer] Patient Education © 2017 [employer]
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
## 2018-01-19 ENCOUNTER — HOSPITAL ENCOUNTER (OUTPATIENT)
Dept: PHYSICAL THERAPY | Facility: HOSPITAL | Age: 53
Setting detail: THERAPIES SERIES
Discharge: HOME | End: 2018-01-19

## 2018-01-19 DIAGNOSIS — R29.898 WEAKNESS OF RIGHT UPPER EXTREMITY: ICD-10-CM

## 2018-01-19 DIAGNOSIS — M54.2 NECK PAIN: Primary | ICD-10-CM

## 2018-01-19 DIAGNOSIS — M79.601 RIGHT ARM PAIN: ICD-10-CM

## 2018-01-19 PROCEDURE — 97161 PT EVAL LOW COMPLEX 20 MIN: CPT | Performed by: PHYSICAL THERAPIST
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
## 2018-01-23 ENCOUNTER — APPOINTMENT (OUTPATIENT)
Dept: PHYSICAL THERAPY | Facility: HOSPITAL | Age: 53
End: 2018-01-23

## 2018-01-24 ENCOUNTER — HOSPITAL ENCOUNTER (OUTPATIENT)
Dept: PHYSICAL THERAPY | Facility: HOSPITAL | Age: 53
Setting detail: THERAPIES SERIES
Discharge: HOME | End: 2018-01-24

## 2018-01-24 DIAGNOSIS — M54.2 NECK PAIN: Primary | ICD-10-CM

## 2018-01-24 DIAGNOSIS — M79.601 RIGHT ARM PAIN: ICD-10-CM

## 2018-01-24 DIAGNOSIS — R29.898 WEAKNESS OF RIGHT UPPER EXTREMITY: ICD-10-CM

## 2018-01-24 PROCEDURE — 97140 MANUAL THERAPY 1/> REGIONS: CPT

## 2018-01-24 NOTE — THERAPY TREATMENT NOTE
Outpatient Physical Therapy Ortho Treatment Note  [facility]     Patient Name: Claudette Ray  : 1965  MRN: [id number]  Today's Date: 2018      Visit Date: 2018    Visit Dx:    ICD-10-CM ICD-9-CM   1. Neck pain M54.2 723.1   2. Weakness of right upper extremity R29.898 729.89   3. Right arm pain M79.601 729.5       Patient Active Problem List   Diagnosis   • Conductive hearing loss in right ear   • Sensorineural hearing loss (SNHL) of both ears   • Tobacco abuse disorder   • Neck pain, chronic   • BMI 20.0-20.9, adult   • Chronic post-traumatic headache, not intractable   • Pain of right upper extremity   • Weakness of right arm        Past Medical History:   Diagnosis Date   • Anxiety    • Depressed    • History of occasional ear infections    • PTSD (post-traumatic stress disorder)         Past Surgical History:   Procedure Laterality Date   • EYE SURGERY     • EYE SURGERY Bilateral     X2   • HERNIA REPAIR     • TYMPANOPLASTY Right     x3 /   • TYMPANOPLASTY Right 2017    Procedure: right tympanoplasty, tympanolysis of adhesions, use of the operative microscope, facial nerve monitoring, tragal cartilaginous grafting;  Surgeon: Delfino Francisco MD;  Location: [facility];  Service:    • WRIST SURGERY                               PT Assessment/Plan       18 0804       PT Assessment    Assessment Comments [facility] been met at this time. Pt reports compliance with HEP given at evaluation; however required review for accuracy with cervical rotation. Pt reports that since evaluation she has started taking a muscle relaxer and her pain has been decreased to a 5/10 most of the time. Todays treatment fouced on STM to decrease gaurding and soft tissue restrictions in B UT as well as cervical and thoracic paraspinals. Trigger point noted in pt's R upper trap. Pt had slight increase in pain with PROM into cervical  "rotation especially on the R, however resolved when exercise complete. Pt did have some relief with cervical traction and post treatment reported that she felt \"more lose\". Upper and mid thoracic remains stiff so this was also a focus of treatment today.   -TR     PT Plan    PT Plan Comments Continue to work on improving soft tissue restrictions and increased thoracic and cervical spine mobility. Assess reflexes and continue to monitor shoulder symptoms.   -TR       User Key  (r) = Recorded By, (t) = Taken By, (c) = Cosigned By    Initials Name Provider Type    JAVIER Silva PTA Physical Therapy Assistant                    Exercises       01/24/18 0804          Subjective Comments    Subjective Comments Pt reports that she normally has pain down into both arms to her elbow however she has started a muscle relaxer and is feeling some relief   -TR      Subjective Pain    Able to rate subjective pain? yes  -TR      Pre-Treatment Pain Level 5  -TR      Subjective Pain Comment Neck  R>L   -TR      Exercise 1    Exercise Name 1 unweighted cervical rotation  -TR      Cueing 1 Verbal  -TR      Sets 1 1  -TR      Reps 1 10  -TR      Additional Comments Reviewed for accuracy with HEP, cues to perfform slower   -TR        User Key  (r) = Recorded By, (t) = Taken By, (c) = Cosigned By    Initials Name Provider Type    JAVIER Silva PTA Physical Therapy Assistant                        Manual Rx (last 36 hours)      Manual Treatments       01/24/18 0804          Manual Rx 1    Manual Rx 1 Location Cervical paraspinals and B UT in supine   -TR      Manual Rx 1 Type STM   -TR      Manual Rx 1 Grade min   -TR      Manual Rx 1 Duration 13  -TR      Manual Rx 2    Manual Rx 2 Location Cervical in supine  -TR      Manual Rx 2 Type PROM into rotation B   -TR      Manual Rx 2 Grade min stretch   -TR      Manual Rx 2 Duration 7 reps each direction   -TR      Manual Rx 3    Manual Rx 3 Location Cervical in supine   " -TR      Manual Rx 3 Type Traction   -TR      Manual Rx 3 Grade 1-2   -TR      Manual Rx 3 Duration 7  -TR      Manual Rx 4    Manual Rx 4 Location Thoracic paraspinals and B UT   -TR      Manual Rx 4 Type STM in massage chair   -TR      Manual Rx 4 Grade min  -TR      Manual Rx 4 Duration 11  -TR      Manual Rx 5    Manual Rx 5 Location Thoracic   -TR      Manual Rx 5 Type Extension mobilizations in massage chair   -TR      Manual Rx 5 Grade 1-2  -TR      Manual Rx 5 Duration 9  -TR        User Key  (r) = Recorded By, (t) = Taken By, (c) = Cosigned By    Initials Name Provider Type    TR Radha Knowles Shira, PTA Physical Therapy Assistant                PT OP Goals       01/24/18 0804       PT Short Term Goals    STG Date to Achieve 02/18/18  -TR     STG 1 Pt will demonstrate 45 degress or greater cervical rotation.   -TR     STG 1 Progress Ongoing  -TR     STG 2 Pt will report pain no greater than 4-5/10 with household and community activities.   -TR     STG 2 Progress Ongoing  -TR     STG 2 Progress Comments Pain normally stay at an 8/10 however pt reports some relief with muscles relaxers and rports paiin has been staying at a 5/10v  -TR     STG 3 Pt will score 15 or less on the NDI.   -TR     STG 3 Progress Ongoing  -TR     Long Term Goals    LTG Date to Achieve 03/20/18  -TR     LTG 1 Pt will demonstrate 65 degress or greater cervical rotaiton in order to be able to drive car.  -TR     LTG 1 Progress Ongoing  -TR     LTG 2 Pt will report pain no greater than 1-2/10 with household and community activities.   -TR     LTG 2 Progress Ongoing  -TR     LTG 3 Pt will demonstrae 40 degress or greater cerivcal extension and 30 degress or greater cervical flexion.   -TR     LTG 3 Progress Ongoing  -TR     LTG 4 Pt will score 5 or less on NDI.   -TR     LTG 4 Progress Ongoing  -TR     Time Calculation    PT Goal Re-Cert Due Date 02/18/18  -TR       User Key  (r) = Recorded By, (t) = Taken By, (c) = Cosigned By     Initials Name Provider Type    TR Radha Silva PTA Physical Therapy Assistant          Therapy Education  Given: HEP, Symptoms/condition management  Program: Reinforced  How Provided: Verbal  Provided to: Patient  Level of Understanding: Verbalized, Demonstrated              Time Calculation:   Start Time: 0804  Stop Time: 0847  Time Calculation (min): 43 min  Total Timed Code Minutes- PT: 43 minute(s)    Therapy Charges for Today     Code Description Service Date Service Provider Modifiers Qty    84690258267 HC PT MANUAL THERAPY EA 15 MIN 1/24/2018 Radha Silva PTA GP 3                    Radha Silva PTA  1/24/2018

## 2018-01-25 ENCOUNTER — HOSPITAL ENCOUNTER (OUTPATIENT)
Dept: PHYSICAL THERAPY | Facility: HOSPITAL | Age: 53
Setting detail: THERAPIES SERIES
Discharge: HOME | End: 2018-01-25

## 2018-01-25 DIAGNOSIS — M79.601 RIGHT ARM PAIN: ICD-10-CM

## 2018-01-25 DIAGNOSIS — M54.2 NECK PAIN: Primary | ICD-10-CM

## 2018-01-25 DIAGNOSIS — R29.898 WEAKNESS OF RIGHT UPPER EXTREMITY: ICD-10-CM

## 2018-01-25 PROCEDURE — 97140 MANUAL THERAPY 1/> REGIONS: CPT

## 2018-01-25 NOTE — THERAPY TREATMENT NOTE
Outpatient Physical Therapy Ortho Treatment Note  [facility]     Patient Name: Claudette Ray  : 1965  MRN: [id number]  Today's Date: 2018      Visit Date: 2018    Visit Dx:    ICD-10-CM ICD-9-CM   1. Neck pain M54.2 723.1   2. Weakness of right upper extremity R29.898 729.89   3. Right arm pain M79.601 729.5       Patient Active Problem List   Diagnosis   • Conductive hearing loss in right ear   • Sensorineural hearing loss (SNHL) of both ears   • Tobacco abuse disorder   • Neck pain, chronic   • BMI 20.0-20.9, adult   • Chronic post-traumatic headache, not intractable   • Pain of right upper extremity   • Weakness of right arm        Past Medical History:   Diagnosis Date   • Anxiety    • Depressed    • History of occasional ear infections    • PTSD (post-traumatic stress disorder)         Past Surgical History:   Procedure Laterality Date   • EYE SURGERY     • EYE SURGERY Bilateral     X2   • HERNIA REPAIR     • TYMPANOPLASTY Right     x3 /   • TYMPANOPLASTY Right 2017    Procedure: right tympanoplasty, tympanolysis of adhesions, use of the operative microscope, facial nerve monitoring, tragal cartilaginous grafting;  Surgeon: Delfino Francisco MD;  Location: [facility];  Service:    • WRIST SURGERY                               PT Assessment/Plan       18 0803       PT Assessment    Assessment Comments Patient continues to report increased neck pain, with more intensity today in left greater than the right.  She continues to have increased guarding in all surrounding muscles of cervical and thoracic spine.  She also rests with her neck in slight right sidebend.  She also has apprehension to movement due to pain being chronic in nature.   Cervical traction did relieve some of her pain today.  -MARCELA     PT Plan    PT Plan Comments We will continue to work to decrease her soft tissue restrictions and cervicothoracic mobility in order to decrease unwanted strain on her  cervical spine.  -MARCELA       User Key  (r) = Recorded By, (t) = Taken By, (c) = Cosigned By    Initials Name Provider Type    MARCELA Philippe Briggs PTA Physical Therapy Assistant                    Exercises       01/25/18 0803          Subjective Comments    Subjective Comments Patient reports she was a little sore yesterday following her first visit.  She reports she is hurting today mostly on the left side of neck and down her trap.  She continues to have pain into both arms to the elbows.  She denies N/T  -MARCELA      Subjective Pain    Able to rate subjective pain? yes  -MARCELA      Pre-Treatment Pain Level 7  -MARCELA      Post-Treatment Pain Level 6  -MARCELA      Subjective Pain Comment Neck L>R  -MARCELA        User Key  (r) = Recorded By, (t) = Taken By, (c) = Cosigned By    Initials Name Provider Type    MARCELA VICTOR ELLEN Briggs Physical Therapy Assistant                        Manual Rx (last 36 hours)      Manual Treatments       01/25/18 0806 01/24/18 0804       Manual Rx 1    Manual Rx 1 Location Cervical paraspinals and B UT,LS in supine   -MARCELA Cervical paraspinals and B UT in supine   -TR     Manual Rx 1 Type STM  -MARCELA STM   -TR     Manual Rx 1 Grade min-mod  -MARCELA min   -TR     Manual Rx 1 Duration 12  -MARCELA 13  -TR     Manual Rx 2    Manual Rx 2 Location Suboccipital releases with upper cervical traction  -MARCELA Cervical in supine  -TR     Manual Rx 2 Type  PROM into rotation B   -TR     Manual Rx 2 Grade min and grade 1 traction  -MARCELA min stretch   -TR     Manual Rx 2 Duration 5   repostined from supine to reclined position  -MARCELA 7 reps each direction   -TR     Manual Rx 3    Manual Rx 3 Location lower cervical traction in reclined position  -MARCELA Cervical in supine   -TR     Manual Rx 3 Type  Traction   -TR     Manual Rx 3 Grade 1-2 sustained  -MARCELA 1-2   -TR     Manual Rx 3 Duration 5  -MARCELA 7  -TR     Manual Rx 4    Manual Rx 4 Location Thoracic paraspinals and B UT   -MARCELA Thoracic paraspinals and B UT   -TR     Manual Rx 4 Type STM in  massage chair   -MARCELA STM in massage chair   -TR     Manual Rx 4 Grade min-mod  -MARCELA min  -TR     Manual Rx 4 Duration 12  -MARCELA 11  -TR     Manual Rx 5    Manual Rx 5 Location Thoracic   -MARCELA Thoracic   -TR     Manual Rx 5 Type Extension mobilizations in massage chair   -MARCELA Extension mobilizations in massage chair   -TR     Manual Rx 5 Grade 1-2  -MARCELA 1-2  -TR     Manual Rx 5 Duration 5  -MARCELA 9  -TR       User Key  (r) = Recorded By, (t) = Taken By, (c) = Cosigned By    Initials Name Provider Type    MARCELA Briggs PTA Physical Therapy Assistant    TR Radha Silva PTA Physical Therapy Assistant                PT OP Goals       01/25/18 0803       PT Short Term Goals    STG Date to Achieve 02/18/18  -MARCELA     STG 1 Pt will demonstrate 45 degress or greater cervical rotation.   -MARCELA     STG 1 Progress Ongoing  -MARCELA     STG 2 Pt will report pain no greater than 4-5/10 with household and community activities.   -MARCELA     STG 2 Progress Ongoing  -MARCELA     STG 2 Progress Comments Pain was recorded as 7/10 at beginning of treatment and 6/10 after  -MARCELA     STG 3 Pt will score 15 or less on the NDI.   -MARCELA     STG 3 Progress Ongoing  -MARCELA     Long Term Goals    LTG Date to Achieve 03/20/18  -MARCELA     LTG 1 Pt will demonstrate 65 degress or greater cervical rotaiton in order to be able to drive car.  -MARCELA     LTG 1 Progress Ongoing  -MARCELA     LTG 2 Pt will report pain no greater than 1-2/10 with household and community activities.   -MARCELA     LTG 2 Progress Ongoing  -MARCELA     LTG 3 Pt will demonstrae 40 degress or greater cerivcal extension and 30 degress or greater cervical flexion.   -MARCELA     LTG 3 Progress Ongoing  -MARCELA     LTG 4 Pt will score 5 or less on NDI.   -MARCELA     LTG 4 Progress Ongoing  -MARCELA     Time Calculation    PT Goal Re-Cert Due Date 02/18/18  -MARCELA       User Key  (r) = Recorded By, (t) = Taken By, (c) = Cosigned By    Initials Name Provider Type    MARCELA Briggs PTA Physical Therapy Assistant          Therapy  Education  Given: HEP  Program: Reinforced  How Provided: Verbal  Provided to: Patient  Level of Understanding: Verbalized              Time Calculation:   Start Time: 0803  Stop Time: 0849  Time Calculation (min): 46 min  Total Timed Code Minutes- PT: 46 minute(s)    Therapy Charges for Today     Code Description Service Date Service Provider Modifiers Qty    89648088314 HC PT MANUAL THERAPY EA 15 MIN 1/25/2018 Philippe Briggs, PTA GP 3                    Philippe Briggs PTA  1/25/2018

## 2018-01-30 ENCOUNTER — HOSPITAL ENCOUNTER (OUTPATIENT)
Dept: PHYSICAL THERAPY | Facility: HOSPITAL | Age: 53
Setting detail: THERAPIES SERIES
Discharge: HOME | End: 2018-01-30

## 2018-01-30 DIAGNOSIS — R29.898 WEAKNESS OF RIGHT UPPER EXTREMITY: ICD-10-CM

## 2018-01-30 DIAGNOSIS — M79.601 RIGHT ARM PAIN: ICD-10-CM

## 2018-01-30 DIAGNOSIS — M54.2 NECK PAIN: Primary | ICD-10-CM

## 2018-01-30 PROCEDURE — 97140 MANUAL THERAPY 1/> REGIONS: CPT | Performed by: PHYSICAL THERAPIST

## 2018-01-30 PROCEDURE — 97110 THERAPEUTIC EXERCISES: CPT | Performed by: PHYSICAL THERAPIST

## 2018-01-30 NOTE — THERAPY TREATMENT NOTE
Outpatient Physical Therapy Ortho Treatment Note  [facility]     Patient Name: Claudette Ray  : 1965  MRN: [id number]  Today's Date: 2018      Visit Date: 2018    Visit Dx:    ICD-10-CM ICD-9-CM   1. Neck pain M54.2 723.1   2. Weakness of right upper extremity R29.898 729.89   3. Right arm pain M79.601 729.5       Patient Active Problem List   Diagnosis   • Conductive hearing loss in right ear   • Sensorineural hearing loss (SNHL) of both ears   • Tobacco abuse disorder   • Neck pain, chronic   • BMI 20.0-20.9, adult   • Chronic post-traumatic headache, not intractable   • Pain of right upper extremity   • Weakness of right arm        Past Medical History:   Diagnosis Date   • Anxiety    • Depressed    • History of occasional ear infections    • PTSD (post-traumatic stress disorder)         Past Surgical History:   Procedure Laterality Date   • EYE SURGERY     • EYE SURGERY Bilateral     X2   • HERNIA REPAIR     • TYMPANOPLASTY Right     x3 /   • TYMPANOPLASTY Right 2017    Procedure: right tympanoplasty, tympanolysis of adhesions, use of the operative microscope, facial nerve monitoring, tragal cartilaginous grafting;  Surgeon: Delfino Francisco MD;  Location: [facility];  Service:    • WRIST SURGERY                               PT Assessment/Plan       18 0740       PT Assessment    Assessment Comments Her cervical ROM is still greatly limited, along with an increase in pain. Increasing cervical flexion in hooklying decreased her pain and allowed her to tolerate hooklying longer. She conitnue to rest in R sidebending and L rotation.   -KR     PT Plan    PT Plan Comments Continue to work cervicothoracic and soft tissue mobility. Progress with postural activities and HEP as tolerated. Possibly adding in chin retractions next visit.   -KR       User Key  (r) = Recorded By, (t) = Taken By, (c) = Cosigned By    Initials Name Provider Type    DUANE Lyman, PT  "DPT Physical Therapist                    Exercises       01/30/18 0740          Subjective Comments    Subjective Comments She reports lower back is \"killing me\" this morning, used a heatinig pad at 4 AM  -KR      Subjective Pain    Able to rate subjective pain? yes  -KR      Pre-Treatment Pain Level 6   midline; lower back 8/10  -KR      Post-Treatment Pain Level 7  -KR      Exercise 1    Exercise Name 1 prone I's  -KR      Cueing 1 Verbal;Tactile  -KR      Sets 1 2  -KR      Reps 1 10  -KR      Exercise 2    Exercise Name 2 seated with towel roll behind and unweighted UE scapular restractions  -KR      Cueing 2 Verbal  -KR      Sets 2 2  -KR      Reps 2 10  -KR      Additional Comments cues to slow down  -KR      Exercise 3    Exercise Name 3 Verbal and tacticle cues for cervical mid line, pt rests in R sidebend and L rotation; cues to complete this at home in from of mirror sevearl times per day.   -KR        User Key  (r) = Recorded By, (t) = Taken By, (c) = Cosigned By    Initials Name Provider Type    DUANE Lyman, PT DPT Physical Therapist                        Manual Rx (last 36 hours)      Manual Treatments       01/30/18 0740          Manual Rx 1    Manual Rx 1 Location Cervical paraspinals and B UT,LS in hooklying  -KR      Manual Rx 1 Type STM  -KR      Manual Rx 1 Grade min-mod  -KR      Manual Rx 1 Duration 12  -KR      Manual Rx 2    Manual Rx 2 Location Suboccipital releases with upper cervical traction  -KR      Manual Rx 2 Grade min and grade 1 traction  -KR      Manual Rx 2 Duration 5  -KR      Manual Rx 3    Manual Rx 3 Location lower cervical traction in hooklying  -KR      Manual Rx 3 Grade 1-2 sustained  -KR      Manual Rx 3 Duration 3  -KR      Manual Rx 4    Manual Rx 4 Location Thoracic paraspinals and B UT   -KR      Manual Rx 4 Type STM in prone  -KR      Manual Rx 4 Grade min-mod  -KR      Manual Rx 4 Duration 5  -KR      Manual Rx 5    Manual Rx 5 Location Thoracic   -KR      " Manual Rx 5 Type  in prone  -KR      Manual Rx 5 Grade 1-2  -KR      Manual Rx 5 Duration 5  -KR        User Key  (r) = Recorded By, (t) = Taken By, (c) = Cosigned By    Initials Name Provider Type    DUANE Lyman, PT DPT Physical Therapist                PT OP Goals       01/30/18 0740       PT Short Term Goals    STG Date to Achieve 02/18/18  -KR     STG 1 Pt will demonstrate 45 degress or greater cervical rotation.   -KR     STG 1 Progress Ongoing  -KR     STG 2 Pt will report pain no greater than 4-5/10 with household and community activities.   -KR     STG 2 Progress Ongoing  -KR     STG 2 Progress Comments 6/10 today  -KR     STG 3 Pt will score 15 or less on the NDI.   -KR     STG 3 Progress Ongoing  -KR     Long Term Goals    LTG Date to Achieve 03/20/18  -KR     LTG 1 Pt will demonstrate 65 degress or greater cervical rotaiton in order to be able to drive car.  -KR     LTG 1 Progress Ongoing  -KR     LTG 1 Progress Comments L 38 R 35  -KR     LTG 2 Pt will report pain no greater than 1-2/10 with household and community activities.   -KR     LTG 2 Progress Ongoing  -KR     LTG 3 Pt will demonstrae 40 degress or greater cerivcal extension and 30 degress or greater cervical flexion.   -KR     LTG 3 Progress Ongoing  -KR     LTG 4 Pt will score 5 or less on NDI.   -KR     LTG 4 Progress Ongoing  -KR     Time Calculation    PT Goal Re-Cert Due Date 02/18/18  -KR       User Key  (r) = Recorded By, (t) = Taken By, (c) = Cosigned By    Initials Name Provider Type    DUANE Lyman, PT DPT Physical Therapist          Therapy Education  Given: HEP, Posture/body mechanics  Program: Reinforced  How Provided: Verbal, Demonstration  Provided to: Patient  Level of Understanding: Verbalized, Demonstrated              Time Calculation:   Start Time: 0740  Stop Time: 0822  Time Calculation (min): 42 min  Total Timed Code Minutes- PT: 42 minute(s)    Therapy Charges for Today     Code Description Service  Date Service Provider Modifiers Qty    52040390858 HC PT MANUAL THERAPY EA 15 MIN 1/30/2018 Anuradha Lyman, PT DPT GP 2    88587659272 HC PT THER PROC EA 15 MIN 1/30/2018 Anuradha Lyman, PT DPT GP 1                    Anuradha Lyman, PT DPT  1/30/2018

## 2018-01-31 ENCOUNTER — PROCEDURE VISIT (OUTPATIENT)
Dept: OTOLARYNGOLOGY | Facility: CLINIC | Age: 53
End: 2018-01-31

## 2018-01-31 ENCOUNTER — OFFICE VISIT (OUTPATIENT)
Dept: OTOLARYNGOLOGY | Facility: CLINIC | Age: 53
End: 2018-01-31

## 2018-01-31 VITALS
WEIGHT: 128.2 LBS | SYSTOLIC BLOOD PRESSURE: 112 MMHG | TEMPERATURE: 98 F | HEIGHT: 65 IN | DIASTOLIC BLOOD PRESSURE: 88 MMHG | BODY MASS INDEX: 21.36 KG/M2

## 2018-01-31 DIAGNOSIS — Z98.890 S/P TYMPANOPLASTY: ICD-10-CM

## 2018-01-31 DIAGNOSIS — H90.11 CONDUCTIVE HEARING LOSS OF RIGHT EAR WITH UNRESTRICTED HEARING OF LEFT EAR: Primary | ICD-10-CM

## 2018-01-31 DIAGNOSIS — H61.22 IMPACTED CERUMEN OF LEFT EAR: ICD-10-CM

## 2018-01-31 PROCEDURE — 99213 OFFICE O/P EST LOW 20 MIN: CPT | Performed by: OTOLARYNGOLOGY

## 2018-01-31 PROCEDURE — 4004F PT TOBACCO SCREEN RCVD TLK: CPT | Performed by: OTOLARYNGOLOGY

## 2018-01-31 NOTE — PATIENT INSTRUCTIONS
IF YOU SMOKE OR USE TOBACCO PLEASE READ THE FOLLOWING:    Why is smoking bad for me?  Smoking increases the risk of heart disease, lung disease, vascular disease, stroke, and cancer.     If you smoke, STOP!    If you would like more information on quitting smoking, please visit the [employer] website: [email or web address]   This link will provide additional resources including the QUIT line and the Beat the Pack support groups.     For more information:    Quit Now [facility]  1-800-QUIT-NOW  https://[email or web address].quitlogix.org/en-[place]/

## 2018-01-31 NOTE — PROGRESS NOTES
Patient Care Team:  Delfino Bennett MD as PCP - General (Pediatrics)  Delfino Francisco MD as Consulting Physician (Otolaryngology)  LAURA Wills as Nurse Practitioner (Otolaryngology)  LAURA Downs as Nurse Practitioner (Otolaryngology)    Chief Complaint   Patient presents with   • Follow-up     recheck ears/hearing       Subjective   HPI   Claudette Ray is a  52 y.o. female who presents for follow up with no acute complaints. Her hearing is slightly improved but more significantly or tenderness is resolved.  She has not had any further ear drainage.  She is status post tympanoplasty on 06/02/2017    Review of Systems:  Reviewed per patient intake note    Past History:  Past Medical History:   Diagnosis Date   • Anxiety    • Depressed    • History of occasional ear infections    • PTSD (post-traumatic stress disorder)      Past Surgical History:   Procedure Laterality Date   • EYE SURGERY     • EYE SURGERY Bilateral     X2   • HERNIA REPAIR     • TYMPANOPLASTY Right     x3 1996/2004/2008   • TYMPANOPLASTY Right 6/2/2017    Procedure: right tympanoplasty, tympanolysis of adhesions, use of the operative microscope, facial nerve monitoring, tragal cartilaginous grafting;  Surgeon: Delfino Francisco MD;  Location: [facility];  Service:    • WRIST SURGERY       Family History   Problem Relation Age of Onset   • Diabetes Mother    • Heart disease Father      Social History   Substance Use Topics   • Smoking status: Current Every Day Smoker     Packs/day: 1.00     Types: Cigarettes   • Smokeless tobacco: Never Used   • Alcohol use 1.2 oz/week     2 Glasses of wine per week     Current Outpatient Prescriptions on File Prior to Visit   Medication Sig   • amitriptyline (ELAVIL) 10 MG tablet    • citalopram (CeleXA) 40 MG tablet    • cyclobenzaprine (FLEXERIL) 10 MG tablet Take 1 tablet by mouth 3 (Three) Times a Day As Needed for Muscle Spasms.   • Multiple Vitamins-Minerals (MULTIVITAMIN PO) Take   by mouth.   • traMADol (ULTRAM) 50 MG tablet Take 1 tablet by mouth Every 8 (Eight) Hours As Needed for Moderate Pain .   • [DISCONTINUED] MethylPREDNISolone (MEDROL, JAVI,) 4 MG tablet Take as directed on package instructions.     No current facility-administered medications on file prior to visit.      Allergies:  Review of patient's allergies indicates no known allergies.    Objective      Vital Signs:   Temp:  [98 °F (36.7 °C)] 98 °F (36.7 °C)  BP: (112)/(88) 112/88    Physical Exam     CONSTITUTIONAL: well nourished, well-developed, alert, oriented, in no acute distress   COMMUNICATION AND VOICE: able to communicate normally, normal voice quality  HEAD: normocephalic, no lesions, atraumatic, no tenderness, no masses   FACE: appearance normal, no lesions, no tenderness, no deformities, facial motion symmetric  EYES: ocular motility normal, eyelids normal, orbits normal, no proptosis, conjunctiva normal , pupils equal, round  HEARING: response to conversational voice normal bilaterally   EXTERNAL EARS: auricles without lesions  EXTERNAL EAR CANALS: normal ear canals without stenosis with asymptomatic cerumen  TYMPANIC MEMBRANE: Right tympanic membrane is healed with a well-healed cartilaginous graft.  Left hepatic membranes normal  EXTERNAL NOSE: structure normal, no tenderness on palpation, no nasal discharge, no lesions, no evidence of trauma, nostrils patent  LIPS: structure normal, no tenderness on palpation, no lesions, no evidence of trauma  NECK: neck appearance normal  LYMPH NODES: no lymphadenopathy  CHEST/RESPIRATORY: respiratory effort normal  CARDIOVASCULAR: extremities without cyanosis or edema, no overt jugulovenous distension present  NEUROLOGIC/PSYCHIATRIC: oriented appropriately for age, mood normal, affect appropriate, cranial nerves intact grossly unless specifically mentioned above     Ear Cerumen Removal Instrumentation  Date/Time: 1/31/2018 9:16 AM  Performed by: ROMELIA THRASHER  RICHARD  Authorized by: ROMELIA THRASHER   Consent: Verbal consent obtained.  Patient understanding: patient states understanding of the procedure being performed    Anesthesia:  Local Anesthetic: none  Location details: left ear  Procedure type: curette  Patient tolerance: Patient tolerated the procedure well with no immediate complications                Assessment   1. Conductive hearing loss of right ear with unrestricted hearing of left ear    2. S/P tympanoplasty        Plan    Conservative management.  Irrigated options discussed.    Return if symptoms worsen or fail to improve.    Romelia Thrasher MD  01/31/18  9:14 AM

## 2018-02-01 ENCOUNTER — HOSPITAL ENCOUNTER (OUTPATIENT)
Dept: PHYSICAL THERAPY | Facility: HOSPITAL | Age: 53
Setting detail: THERAPIES SERIES
Discharge: HOME | End: 2018-02-01

## 2018-02-01 DIAGNOSIS — M54.2 NECK PAIN: Primary | ICD-10-CM

## 2018-02-01 DIAGNOSIS — M79.601 RIGHT ARM PAIN: ICD-10-CM

## 2018-02-01 DIAGNOSIS — R29.898 WEAKNESS OF RIGHT UPPER EXTREMITY: ICD-10-CM

## 2018-02-01 PROCEDURE — 97140 MANUAL THERAPY 1/> REGIONS: CPT

## 2018-02-01 NOTE — THERAPY TREATMENT NOTE
Outpatient Physical Therapy Ortho Treatment Note  [facility]     Patient Name: Claudette Ray  : 1965  MRN: [id number]  Today's Date: 2018      Visit Date: 2018    Visit Dx:    ICD-10-CM ICD-9-CM   1. Neck pain M54.2 723.1   2. Weakness of right upper extremity R29.898 729.89   3. Right arm pain M79.601 729.5       Patient Active Problem List   Diagnosis   • Conductive hearing loss in right ear   • Sensorineural hearing loss (SNHL) of both ears   • Tobacco abuse disorder   • Neck pain, chronic   • BMI 20.0-20.9, adult   • Chronic post-traumatic headache, not intractable   • Pain of right upper extremity   • Weakness of right arm        Past Medical History:   Diagnosis Date   • Anxiety    • Depressed    • History of occasional ear infections    • PTSD (post-traumatic stress disorder)         Past Surgical History:   Procedure Laterality Date   • EYE SURGERY     • EYE SURGERY Bilateral     X2   • HERNIA REPAIR     • TYMPANOPLASTY Right     x3 /   • TYMPANOPLASTY Right 2017    Procedure: right tympanoplasty, tympanolysis of adhesions, use of the operative microscope, facial nerve monitoring, tragal cartilaginous grafting;  Surgeon: Delfino Francisco MD;  Location: [facility];  Service:    • WRIST SURGERY                               PT Assessment/Plan       18 0801       PT Assessment    Assessment Comments Patient presented with increased pain today on her right side of her neck more than the left.  Therefore, manual techniques were used in order to decrease soft tissue restrictions, improve mobility, decrease pain, and improve PROM.  Her PROM did improve slightly after treatment, but she continues to rest in right rotation and therefore she lacks more of rotation to the left.  She had 45 degrees of PROM cervical rotation and 25 degrees of PROM left rotation.  -MARCELA     PT Plan    PT Plan Comments We will continue to decrease her guarding while improving her  "cervicothoracic mobility. We will also progress to more postural education and midline orientation.  -MARCELA       User Key  (r) = Recorded By, (t) = Taken By, (c) = Cosigned By    Initials Name Provider Type    MARCELA Briggs PTA Physical Therapy Assistant                    Exercises       02/01/18 0801          Subjective Comments    Subjective Comments She reports she is hurting really  bad today on the right side of her neck. She reports as if she has a \"pinched nerve\" on the right.  She reports the increase of pain started yesterday without a known cause.  She reports this is not new symptoms but more intense.  She denied any symptoms down the arms.  She reports her lower back feels ok today.  -MARCELA      Subjective Pain    Able to rate subjective pain? yes  -MARCELA      Pre-Treatment Pain Level 8  -MARCELA      Post-Treatment Pain Level 6  -MARCELA      Subjective Pain Comment Neck R>L  -MARCELA        User Key  (r) = Recorded By, (t) = Taken By, (c) = Cosigned By    Initials Name Provider Type    MARCELA Briggs PTA Physical Therapy Assistant                        Manual Rx (last 36 hours)      Manual Treatments       02/01/18 0804          Manual Rx 1    Manual Rx 1 Location Cervical paraspinals and B UT,LS in hooklying  -MARCELA      Manual Rx 1 Type STM  -MARCELA      Manual Rx 1 Grade min-mod  -MARCELA      Manual Rx 1 Duration 12  -MARCELA      Manual Rx 2    Manual Rx 2 Location Suboccipital releases with upper cervical traction  -MARCELA      Manual Rx 2 Grade min and grade 1 traction  -MARCELA      Manual Rx 2 Duration 5  -MARCELA      Manual Rx 3    Manual Rx 3 Location lower cervical traction in hooklying  -MARCELA      Manual Rx 3 Grade 1-2 sustained  -MARCELA      Manual Rx 3 Duration 9 minutes total  -MARCELA      Manual Rx 4    Manual Rx 4 Location Thoracic paraspinals and B UT : in prone with pillow under abdomen  -MARCELA      Manual Rx 4 Type STM in prone and with blue ridged foam foller  -MARCELA      Manual Rx 4 Grade min-mod  -MARCELA      Manual Rx 4 Duration 5  " -MARCELA      Manual Rx 5    Manual Rx 5 Location Thoracic  in prone with pillow under abdomen  -MARCELA      Manual Rx 5 Type extension mobilization  -MARCELA      Manual Rx 5 Grade 1-2  -MARCELA      Manual Rx 5 Duration 5  -MARCELA      Manual Rx 6    Manual Rx 6 Location Cervical PROM  -MARCELA      Manual Rx 6 Grade x 15 reps each direction painfree  -MARCELA      Manual Rx 6 Duration 4  -MARCELA        User Key  (r) = Recorded By, (t) = Taken By, (c) = Cosigned By    Initials Name Provider Type    MARCELA Briggs PTA Physical Therapy Assistant                PT OP Goals       02/01/18 0801       PT Short Term Goals    STG Date to Achieve 02/18/18  -MARCELA     STG 1 Pt will demonstrate 45 degress or greater cervical rotation.   -MARCELA     STG 1 Progress Ongoing  -MARCELA     STG 1 Progress Comments PROM did improve after PROM into rotation, but it continues to be asymmetrical with less of the left.  (R) 45 degrees, (L) 25 degrees  -MARCELA     STG 2 Pt will report pain no greater than 4-5/10 with household and community activities.   -MARCELA     STG 2 Progress Ongoing  -MARCELA     STG 3 Pt will score 15 or less on the NDI.   -MARCELA     STG 3 Progress Ongoing  -MARCELA     Long Term Goals    LTG Date to Achieve 03/20/18  -MARCELA     LTG 1 Pt will demonstrate 65 degress or greater cervical rotaiton in order to be able to drive car.  -MARCELA     LTG 1 Progress Ongoing  -MARCELA     LTG 2 Pt will report pain no greater than 1-2/10 with household and community activities.   -MARCELA     LTG 2 Progress Ongoing  -MARCELA     LTG 3 Pt will demonstrae 40 degress or greater cerivcal extension and 30 degress or greater cervical flexion.   -MARCELA     LTG 3 Progress Ongoing  -MARCELA     LTG 4 Pt will score 5 or less on NDI.   -MARCELA     LTG 4 Progress Ongoing  -MARCELA     Time Calculation    PT Goal Re-Cert Due Date 02/18/18  -MARCELA       User Key  (r) = Recorded By, (t) = Taken By, (c) = Cosigned By    Initials Name Provider Type    MARCELA Briggs PTA Physical Therapy Assistant          Therapy Education  Given: HEP,  Posture/body mechanics  Program: Reinforced  How Provided: Verbal  Provided to: Patient  Level of Understanding: Verbalized              Time Calculation:   Start Time: 0801  Stop Time: 0851  Time Calculation (min): 50 min  Total Timed Code Minutes- PT: 50 minute(s)    Therapy Charges for Today     Code Description Service Date Service Provider Modifiers Qty    [id number] HC PT MANUAL THERAPY EA 15 MIN 2/1/2018 Philippe Briggs, PTA GP 3                    Philippe Briggs PTA  2/1/2018

## 2018-02-05 ENCOUNTER — HOSPITAL ENCOUNTER (OUTPATIENT)
Dept: PHYSICAL THERAPY | Facility: HOSPITAL | Age: 53
Setting detail: THERAPIES SERIES
Discharge: HOME | End: 2018-02-05

## 2018-02-05 ENCOUNTER — TELEPHONE (OUTPATIENT)
Dept: NEUROSURGERY | Facility: CLINIC | Age: 53
End: 2018-02-05

## 2018-02-05 ENCOUNTER — OFFICE VISIT (OUTPATIENT)
Dept: NEUROSURGERY | Facility: CLINIC | Age: 53
End: 2018-02-05

## 2018-02-05 VITALS
SYSTOLIC BLOOD PRESSURE: 118 MMHG | DIASTOLIC BLOOD PRESSURE: 80 MMHG | BODY MASS INDEX: 21.33 KG/M2 | WEIGHT: 128 LBS | HEIGHT: 65 IN

## 2018-02-05 DIAGNOSIS — M79.601 RIGHT ARM PAIN: ICD-10-CM

## 2018-02-05 DIAGNOSIS — R29.898 WEAKNESS OF RIGHT UPPER EXTREMITY: ICD-10-CM

## 2018-02-05 DIAGNOSIS — M79.601 PAIN OF RIGHT UPPER EXTREMITY: ICD-10-CM

## 2018-02-05 DIAGNOSIS — M54.2 NECK PAIN: Primary | ICD-10-CM

## 2018-02-05 DIAGNOSIS — G89.29 NECK PAIN, CHRONIC: Primary | ICD-10-CM

## 2018-02-05 DIAGNOSIS — Z72.0 TOBACCO ABUSE DISORDER: ICD-10-CM

## 2018-02-05 DIAGNOSIS — M54.2 NECK PAIN, CHRONIC: Primary | ICD-10-CM

## 2018-02-05 PROCEDURE — 99213 OFFICE O/P EST LOW 20 MIN: CPT | Performed by: NEUROLOGICAL SURGERY

## 2018-02-05 PROCEDURE — 97140 MANUAL THERAPY 1/> REGIONS: CPT

## 2018-02-05 PROCEDURE — 97110 THERAPEUTIC EXERCISES: CPT

## 2018-02-05 NOTE — TELEPHONE ENCOUNTER
CALLED PT TO ADVISE HER THAT DUE TO HER HAVING [employer] INSURANCE THAT HER PAIN MANAGEMENT REFERRAL WILL NEED TO COME FROM HER PCP.

## 2018-02-05 NOTE — PATIENT INSTRUCTIONS
Smoking Hazards  Smoking cigarettes is extremely bad for your health. Tobacco smoke has over 200 known poisons in it. It contains the poisonous gases nitrogen oxide and carbon monoxide. There are over 60 chemicals in tobacco smoke that cause cancer. Some of the chemicals found in cigarette smoke include:   · Cyanide.    · Benzene.    · Formaldehyde.    · Methanol (wood alcohol).    · Acetylene (fuel used in welding torches).    · Ammonia.    Even smoking lightly shortens your life expectancy by several years. You can greatly reduce the risk of medical problems for you and your family by stopping now. Smoking is the most preventable cause of death and disease in our society. Within days of quitting smoking, your circulation improves, you decrease the risk of having a heart attack, and your lung capacity improves. There may be some increased phlegm in the first few days after quitting, and it may take months for your lungs to clear up completely. Quitting for 10 years reduces your risk of developing lung cancer to almost that of a nonsmoker.   WHAT ARE THE RISKS OF SMOKING?  Cigarette smokers have an increased risk of many serious medical problems, including:  · Lung cancer.    · Lung disease (such as pneumonia, bronchitis, and emphysema).    · Heart attack and chest pain due to the heart not getting enough oxygen (angina).    · Heart disease and peripheral blood vessel disease.    · Hypertension.    · Stroke.    · Oral cancer (cancer of the lip, mouth, or voice box).    · Bladder cancer.    · Pancreatic cancer.    · Cervical cancer.    · Pregnancy complications, including premature birth.    · Stillbirths and smaller  babies, birth defects, and genetic damage to sperm.    · Early menopause.    · Lower estrogen level for women.    · Infertility.    · Facial wrinkles.    · Blindness.    · Increased risk of broken bones (fractures).    · Senile dementia.    · Stomach ulcers and internal bleeding.    · Delayed  wound healing and increased risk of complications during surgery.  Because of secondhand smoke exposure, children of smokers have an increased risk of the following:   · Sudden infant death syndrome (SIDS).    · Respiratory infections.    · Lung cancer.    · Heart disease.    · Ear infections.    WHY IS SMOKING ADDICTIVE?  Nicotine is the chemical agent in tobacco that is capable of causing addiction or dependence. When you smoke and inhale, nicotine is absorbed rapidly into the bloodstream through your lungs. Both inhaled and noninhaled nicotine may be addictive.   WHAT ARE THE BENEFITS OF QUITTING?   There are many health benefits to quitting smoking. Some are:   · The likelihood of developing cancer and heart disease decreases. Health improvements are seen almost immediately.    · Blood pressure, pulse rate, and breathing patterns start returning to normal soon after quitting.    · People who quit may see an improvement in their overall quality of life.    HOW DO YOU QUIT SMOKING?  Smoking is an addiction with both physical and psychological effects, and longtime habits can be hard to change. Your health care provider can recommend:  · Programs and community resources, which may include group support, education, or therapy.  · Replacement products, such as patches, gum, and nasal sprays. Use these products only as directed. Do not replace cigarette smoking with electronic cigarettes (commonly called e-cigarettes). The safety of e-cigarettes is unknown, and some may contain harmful chemicals.  FOR MORE INFORMATION  · American Lung Association: www.lung.org  · American Cancer Society: www.cancer.org  This information is not intended to replace advice given to you by your health care provider. Make sure you discuss any questions you have with your health care provider.  Document Released: 01/25/2006 Document Revised: 04/10/2017 Document Reviewed: 06/09/2014  Elsevier Interactive Patient Education © 2017 [employer]  [employer]
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
## 2018-02-07 ENCOUNTER — APPOINTMENT (OUTPATIENT)
Dept: PHYSICAL THERAPY | Facility: HOSPITAL | Age: 53
End: 2018-02-07

## 2018-02-12 ENCOUNTER — HOSPITAL ENCOUNTER (OUTPATIENT)
Dept: PHYSICAL THERAPY | Facility: HOSPITAL | Age: 53
Setting detail: THERAPIES SERIES
Discharge: HOME | End: 2018-02-12

## 2018-02-12 DIAGNOSIS — M54.2 NECK PAIN: Primary | ICD-10-CM

## 2018-02-12 DIAGNOSIS — R29.898 WEAKNESS OF RIGHT UPPER EXTREMITY: ICD-10-CM

## 2018-02-12 DIAGNOSIS — M79.601 RIGHT ARM PAIN: ICD-10-CM

## 2018-02-12 PROCEDURE — 97140 MANUAL THERAPY 1/> REGIONS: CPT

## 2018-02-12 PROCEDURE — 97110 THERAPEUTIC EXERCISES: CPT

## 2018-02-12 NOTE — THERAPY PROGRESS REPORT/RE-CERT
Outpatient Physical Therapy Ortho Progress Note  [facility]     Patient Name: Claudette Ray  : 1965  MRN: [id number]  Today's Date: 2018      Visit Date: 2018    Visit Dx:    ICD-10-CM ICD-9-CM   1. Neck pain M54.2 723.1   2. Weakness of right upper extremity R29.898 729.89   3. Right arm pain M79.601 729.5       Patient Active Problem List   Diagnosis   • Conductive hearing loss in right ear   • Sensorineural hearing loss (SNHL) of both ears   • Tobacco abuse disorder   • Neck pain, chronic   • BMI 20.0-20.9, adult   • Chronic post-traumatic headache, not intractable   • Pain of right upper extremity   • Weakness of right arm        Past Medical History:   Diagnosis Date   • Anxiety    • Depressed    • History of occasional ear infections    • PTSD (post-traumatic stress disorder)         Past Surgical History:   Procedure Laterality Date   • EYE SURGERY     • EYE SURGERY Bilateral     X2   • HERNIA REPAIR     • TYMPANOPLASTY Right     x3 /   • TYMPANOPLASTY Right 2017    Procedure: right tympanoplasty, tympanolysis of adhesions, use of the operative microscope, facial nerve monitoring, tragal cartilaginous grafting;  Surgeon: Delfino Francisco MD;  Location: [facility];  Service:    • WRIST SURGERY                               PT Assessment/Plan       18 0845       PT Assessment    Assessment Comments Pt reported that she is discouraged because her Dr told her that sx was not an option and she is tired of hurting. Pt reports that she is going to try pain management as well as continue PT. Pt does report a decrease in pain sinc starting therapy but continued difficulty with ADL's and driving. Pt has met no goals at this time. Pt's ROM for cervical rotation has improved Bilaterally while flexion and extension has stayed the same. Added gentle ROM for flexion extension as well as chin tucks to HEP to address this. [facility]. Pt continue to have stiff thoracic spine as well as as some gaurding in B UT. Pt reported improved pain with cervical traction today. Pt will continue to benefit from skilled PT to improve symptoms, decrease gaurding and improve cervicothoracic mobility.    -TR     PT Plan    PT Plan Comments Await authorizations and schedule for more if approved. Continue with improving cervicothoracic mobility and progress HEP as tolerated for postural control.   -TR       User Key  (r) = Recorded By, (t) = Taken By, (c) = Cosigned By    Initials Name Provider Type    JAVIER Silva PTA Physical Therapy Assistant                    Exercises       02/12/18 0845          Subjective Comments    Subjective Comments Pt reports that she went to the Dr last week and he reported that at this point surgery is not going to help. Pt reports that she is going to try pain management as well as continue PT.    -TR      Subjective Pain    Able to rate subjective pain? yes  -TR      Pre-Treatment Pain Level 7  -TR      Post-Treatment Pain Level 7  -TR      Subjective Pain Comment R>L   -TR      Exercise 1    Exercise Name 1 Reviewed HEP adding cervical extensiono flexion to ROM exercises and chin tucks   -TR      Exercise 2    Exercise Name 2 Chin Tucks   -TR      Cueing 2 Verbal;Tactile;Demo  -TR      Sets 2 1  -TR      Reps 2 10  -TR      Exercise 3    Exercise Name 3 Addressed goals for progress note and measurements for authorization form   -TR        User Key  (r) = Recorded By, (t) = Taken By, (c) = Cosigned By    Initials Name Provider Type    JAVIER Silva PTA Physical Therapy Assistant                        Manual Rx (last 36 hours)      Manual Treatments       02/12/18 0845          Manual Rx 1    Manual Rx 1 Location Cervical paraspinals and B UT,LS in hooklying  -TR      Manual Rx 1 Type STM  -TR      Manual Rx 1 Grade min-mod  -TR      Manual Rx 1 Duration 6  -TR      Manual Rx 2     Manual Rx 2 Location Suboccipital releases with upper cervical traction  -TR      Manual Rx 2 Type started in hooklying moved to hooklying position due to pain   -TR      Manual Rx 2 Grade min and grade 1 traction  -TR      Manual Rx 2 Duration 5  -TR      Manual Rx 3    Manual Rx 3 Location lower cervical traction in reclined position 30 degrees   -TR      Manual Rx 3 Grade 1-2 sustained  -TR      Manual Rx 3 Duration 5  -TR      Manual Rx 5    Manual Rx 5 Location Thoracic  in prone with pillow under abdomen  -TR      Manual Rx 5 Type extension mobilization  -TR      Manual Rx 5 Grade 1-2  -TR      Manual Rx 5 Duration 8  -TR        User Key  (r) = Recorded By, (t) = Taken By, (c) = Cosigned By    Initials Name Provider Type    TR Radha Silva, PTA Physical Therapy Assistant                PT OP Goals       02/12/18 0845       PT Short Term Goals    STG Date to Achieve 02/27/18  -TR     STG 1 Pt will demonstrate 45 degress or greater cervical rotation.   -TR     STG 1 Progress Ongoing  -TR     STG 1 Progress Comments 37 degrees R rotation and 38 degrees L rotation   -TR     STG 2 Pt will report pain no greater than 4-5/10 with household and community activities.   -TR     STG 2 Progress Ongoing  -TR     STG 2 Progress Comments 8-10 with household activties   -TR     STG 3 Pt will score 15 or less on the NDI.   -TR     STG 3 Progress Ongoing  -TR     STG 3 Progress Comments 25 today   -TR     Long Term Goals    LTG Date to Achieve 03/20/18  -TR     LTG 1 Pt will demonstrate 65 degress or greater cervical rotaiton in order to be able to drive car.  -TR     LTG 1 Progress Ongoing  -TR     LTG 1 Progress Comments See STG, pt reports continued difficulty with driving   -TR     LTG 2 Pt will report pain no greater than 1-2/10 with household and community activities.   -TR     LTG 2 Progress Ongoing  -TR     LTG 2 Progress Comments 6/10 all of the time   -TR     LTG 3 Pt will demonstrae 40 degress or greater  cerivcal extension and 30 degress or greater cervical flexion.   -TR     LTG 3 Progress Ongoing  -TR     LTG 3 Progress Comments Extension 20 flexion 16  -TR     LTG 4 Pt will score 5 or less on NDI.   -TR     LTG 4 Progress Ongoing  -TR     LTG 4 Progress Comments 25 today   -TR     Time Calculation    PT Goal Re-Cert Due Date 03/14/18  -TR       User Key  (r) = Recorded By, (t) = Taken By, (c) = Cosigned By    Initials Name Provider Type    JAVIER Silva PTA Physical Therapy Assistant          Therapy Education  Education Details: Reviewed previous exercises, added flexion/extension ROM and chin tucks   Given: HEP, Symptoms/condition management, Pain management  Program: New  How Provided: Verbal, Demonstration  Provided to: Patient  Level of Understanding: Verbalized, Demonstrated    Outcome Measure Options: Neck Disability Index (NDI)  Neck Disability Index  Section 1 - Pain Intensity: The pain is fairly severe at the moment.  Section 2 - Personal Care: I can look after myself normally, but it causes extra pain.  Section 3 - Lifting: Pain prevents me from lifting heavy weights, but I can manage light weights if they are conveniently positioned.  Section 4 - Work: I can't do my usual work.  Section 5 - Headaches: I have moderate headaches that come infrequently.  Section 6 - Concentration: I have a fair degree of difficulty concentrating.  Section 7 - Sleeping: My sleep is moderately disturbed for up to 2-3 hours.  Section 8 - Driving: I can drive as long as I want with moderate neck pain.  Section 9 - Reading: I can't read as much as I want because of moderate neck pain.  Section 10 - Recreation: I have neck pain with most recreational activities.  Neck Disability Index Score: 25  Neck Disability Index Comments: Pt did not have glasses or contacts so i had to ready her each response       Time Calculation:   Start Time: 0845  Stop Time: 0930  Time Calculation (min): 45 min  Total Timed Code Minutes-  PT: 45 minute(s)    Therapy Charges for Today     Code Description Service Date Service Provider Modifiers Qty    28976308739 HC PT MANUAL THERAPY EA 15 MIN 2/12/2018 Radha Silva PTA GP 2    44655893328 HC PT THER PROC EA 15 MIN 2/12/2018 Radha Silva PTA GP 1          PT G-Codes  Outcome Measure Options: Neck Disability Index (NDI)         Radha Silva PTA  2/12/2018

## 2018-02-23 ENCOUNTER — HOSPITAL ENCOUNTER (OUTPATIENT)
Dept: PHYSICAL THERAPY | Facility: HOSPITAL | Age: 53
Setting detail: THERAPIES SERIES
Discharge: HOME | End: 2018-02-23

## 2018-02-23 DIAGNOSIS — M54.2 NECK PAIN: Primary | ICD-10-CM

## 2018-02-23 DIAGNOSIS — M79.601 RIGHT ARM PAIN: ICD-10-CM

## 2018-02-23 DIAGNOSIS — R29.898 WEAKNESS OF RIGHT UPPER EXTREMITY: ICD-10-CM

## 2018-02-23 PROCEDURE — 97140 MANUAL THERAPY 1/> REGIONS: CPT

## 2018-02-23 PROCEDURE — 97110 THERAPEUTIC EXERCISES: CPT

## 2018-02-23 NOTE — THERAPY TREATMENT NOTE
Outpatient Physical Therapy Ortho Treatment Note  [facility]     Patient Name: Claudette Ray  : 1965  MRN: [id number]  Today's Date: 2018      Visit Date: 2018    Visit Dx:    ICD-10-CM ICD-9-CM   1. Neck pain M54.2 723.1   2. Weakness of right upper extremity R29.898 729.89   3. Right arm pain M79.601 729.5       Patient Active Problem List   Diagnosis   • Conductive hearing loss in right ear   • Sensorineural hearing loss (SNHL) of both ears   • Tobacco abuse disorder   • Neck pain, chronic   • BMI 20.0-20.9, adult   • Chronic post-traumatic headache, not intractable   • Pain of right upper extremity   • Weakness of right arm        Past Medical History:   Diagnosis Date   • Anxiety    • Depressed    • History of occasional ear infections    • PTSD (post-traumatic stress disorder)         Past Surgical History:   Procedure Laterality Date   • EYE SURGERY     • EYE SURGERY Bilateral     X2   • HERNIA REPAIR     • TYMPANOPLASTY Right     x3 /   • TYMPANOPLASTY Right 2017    Procedure: right tympanoplasty, tympanolysis of adhesions, use of the operative microscope, facial nerve monitoring, tragal cartilaginous grafting;  Surgeon: Delfino Francisco MD;  Location: [facility];  Service:    • WRIST SURGERY                               PT Assessment/Plan       18 1000       PT Assessment    Assessment Comments Patient's pain remains about the same per patient.  However, overall her cervical rotation was improved greatly today bilaterally, and she met her short term goal for cervical rotation. Her guarding was also decreased today.  She responds better to active approach versus passive  neck ROM due to guarding against passive.  Therefore, moving forward we will continue to progress with more active approach for posture and cervical motion.  -MARCELA     PT Plan    PT Plan Comments We will continue to work on soft tissue restrictions, cervical mobility, and progress with more  postural training.  -MARCELA       User Key  (r) = Recorded By, (t) = Taken By, (c) = Cosigned By    Initials Name Provider Type    MARCELA Briggs PTA Physical Therapy Assistant                    Exercises       02/23/18 1000          Subjective Comments    Subjective Comments Patient reports she continues to be about the same.  She reports her neck hurts, but she reports her motion is better.  -MARCELA      Subjective Pain    Able to rate subjective pain? yes  -MARCELA      Pre-Treatment Pain Level 5  -MARCELA      Post-Treatment Pain Level 5  -MARCELA      Subjective Pain Comment R>L  -MARCELA      Exercise 1    Exercise Name 1 seated W's with towel roll at back  -MARCELA      Cueing 1 Verbal;Tactile;Demo  -MARCELA      Sets 1 3  -MARCELA      Reps 1 10  -MARCELA      Additional Comments yellow band  -MARCELA      Exercise 2    Exercise Name 2 measured cervical ROM before and after treatment  -MARCELA      Cueing 2 Verbal;Tactile  -MARCELA      Additional Comments see goal section   -MARCELA        User Key  (r) = Recorded By, (t) = Taken By, (c) = Cosigned By    Initials Name Provider Type    AMRCELA Briggs PTA Physical Therapy Assistant                        Manual Rx (last 36 hours)      Manual Treatments       02/23/18 1003          Manual Rx 1    Manual Rx 1 Location Cervical paraspinals and B UT,LS in hooklying  -MARCELA      Manual Rx 1 Type STM  -MARCELA      Manual Rx 1 Grade min-mod  -MARCELA      Manual Rx 1 Duration 4  -MARCELA      Manual Rx 2    Manual Rx 2 Location Suboccipital releases with upper cervical traction  -MARCELA      Manual Rx 2 Grade min  -MARCELA      Manual Rx 2 Duration 3  -MARCELA      Manual Rx 3    Manual Rx 3 Location lower cervical traction  -MARCELA      Manual Rx 3 Grade 2 sustained  -MARCELA      Manual Rx 3 Duration 10  -MARCELA      Manual Rx 4    Manual Rx 4 Location PROM cervical rotation, however she was too guarded for this and therefore did not complete  -MARCELA        User Key  (r) = Recorded By, (t) = Taken By, (c) = Cosigned By    Initials Name Provider Type    MARCELA VICTOR  ELLEN Briggs Physical Therapy Assistant                PT OP Goals       02/23/18 1000       PT Short Term Goals    STG Date to Achieve 02/27/18  -MARCELA     STG 1 Pt will demonstrate 45 degress or greater cervical rotation.   -MARCELA     STG 1 Progress Met  -MARCELA     STG 1 Progress Comments (R) 57 degrees (L) 50 degrees without increase of pain but only baseline pain  -MARCELA     STG 2 Pt will report pain no greater than 4-5/10 with household and community activities.   -MARCELA     STG 2 Progress Ongoing  -MARCELA     STG 3 Pt will score 15 or less on the NDI.   -MARCELA     STG 3 Progress Ongoing  -MARCELA     Long Term Goals    LTG Date to Achieve 03/20/18  -MARCELA     LTG 1 Pt will demonstrate 65 degress or greater cervical rotaiton in order to be able to drive car.  -MARCELA     LTG 1 Progress Ongoing  -MARCELA     LTG 2 Pt will report pain no greater than 1-2/10 with household and community activities.   -MARCELA     LTG 2 Progress Ongoing  -MARCELA     LTG 3 Pt will demonstrae 40 degress or greater cerivcal extension and 30 degress or greater cervical flexion.   -MARCELA     LTG 3 Progress Ongoing  -MARCELA     LTG 4 Pt will score 5 or less on NDI.   -MARCELA     LTG 4 Progress Ongoing  -MARCELA     Time Calculation    PT Goal Re-Cert Due Date 03/14/18  -MARCELA       User Key  (r) = Recorded By, (t) = Taken By, (c) = Cosigned By    Initials Name Provider Type    MARCELA Briggs PTA Physical Therapy Assistant          Therapy Education  Given: HEP  Program: Reinforced  How Provided: Verbal  Provided to: Patient  Level of Understanding: Verbalized              Time Calculation:   Start Time: 1000  Stop Time: 1030  Time Calculation (min): 30 min  Total Timed Code Minutes- PT: 30 minute(s)    Therapy Charges for Today     Code Description Service Date Service Provider Modifiers Qty    62320046324 HC PT MANUAL THERAPY EA 15 MIN 2/23/2018 Philippe Briggs PTA GP 1    33913996140 HC PT THER PROC EA 15 MIN 2/23/2018 Philippe Briggs PTA GP 1                    Philippe Briggs  PTA  2/23/2018

## 2018-02-27 ENCOUNTER — HOSPITAL ENCOUNTER (OUTPATIENT)
Dept: PHYSICAL THERAPY | Facility: HOSPITAL | Age: 53
Setting detail: THERAPIES SERIES
Discharge: HOME | End: 2018-02-27

## 2018-02-27 DIAGNOSIS — M54.2 NECK PAIN: Primary | ICD-10-CM

## 2018-02-27 DIAGNOSIS — M79.601 RIGHT ARM PAIN: ICD-10-CM

## 2018-02-27 DIAGNOSIS — R29.898 WEAKNESS OF RIGHT UPPER EXTREMITY: ICD-10-CM

## 2018-02-27 PROCEDURE — 97110 THERAPEUTIC EXERCISES: CPT

## 2018-02-27 PROCEDURE — 97140 MANUAL THERAPY 1/> REGIONS: CPT

## 2018-02-27 NOTE — THERAPY TREATMENT NOTE
Outpatient Physical Therapy Ortho Treatment Note   [place]     Patient Name: lCaudette Ray  : 1965  MRN: [id number]  Today's Date: 2018      Visit Date: 2018    Visit Dx:    ICD-10-CM ICD-9-CM   1. Neck pain M54.2 723.1   2. Right arm pain M79.601 729.5   3. Weakness of right upper extremity R29.898 729.89       Patient Active Problem List   Diagnosis   • Conductive hearing loss in right ear   • Sensorineural hearing loss (SNHL) of both ears   • Tobacco abuse disorder   • Neck pain, chronic   • BMI 20.0-20.9, adult   • Chronic post-traumatic headache, not intractable   • Pain of right upper extremity   • Weakness of right arm        Past Medical History:   Diagnosis Date   • Anxiety    • Depressed    • History of occasional ear infections    • PTSD (post-traumatic stress disorder)         Past Surgical History:   Procedure Laterality Date   • EYE SURGERY     • EYE SURGERY Bilateral     X2   • HERNIA REPAIR     • TYMPANOPLASTY Right     x3 /   • TYMPANOPLASTY Right 2017    Procedure: right tympanoplasty, tympanolysis of adhesions, use of the operative microscope, facial nerve monitoring, tragal cartilaginous grafting;  Surgeon: Delfino Francisco MD;  Location: [facility];  Service:    • WRIST SURGERY                               PT Assessment/Plan       18 0915       PT Assessment    Assessment Comments Patient reports that her pain is staying about the same however her ROM continues to improve. Todays treatment continued to focus on improving thoracic mobility as well as cervical and decreasing gaurding thorughout surrounding soft tissue. Gaurding has decreased however pt  with soft tissue to thoracic paraspinals. Again pt reacts better to AROM then PROM in the cervical spine due to increased gaurding with PROM. Progressed postural strengtheing today and added two new components to HEP for scapular strengthening and B pec stretches.   -TR     PT Plan     PT Plan Comments Continue to work on soft tissue restrictions, mobility throughout cervical and thoracic spine, and postural training.   -TR       User Key  (r) = Recorded By, (t) = Taken By, (c) = Cosigned By    Initials Name Provider Type    AJVIER Radha Eleni Silva PTA Physical Therapy Assistant                    Exercises       02/27/18 0915          Subjective Comments    Subjective Comments Pt reports that her neck pain is the same. ROM continues to improve.   -TR      Subjective Pain    Able to rate subjective pain? yes  -TR      Pre-Treatment Pain Level 5  -TR      Subjective Pain Comment Neck R>L  -TR      Exercise 1    Exercise Name 1 Seated UE phasic with yellow T-band   -TR      Cueing 1 Verbal;Tactile  -TR      Sets 1 2  -TR      Reps 1 10  -TR      Additional Comments Added to HEP Yellow band   -TR      Exercise 2    Exercise Name 2 Towel roll stretch   -TR      Cueing 2 Verbal;Tactile  -TR      Sets 2 1  -TR      Time (Minutes) 2 2  -TR      Exercise 3    Exercise Name 3 Hooklying on towel roll BUE diagonals with Yellow T-band   -TR      Cueing 3 Verbal  -TR      Sets 3 2  -TR      Reps 3 10  -TR      Exercise 4    Exercise Name 4 Doorway pec stretch   -TR      Cueing 4 Verbal  -TR      Sets 4 3  -TR      Time (Seconds) 4 15  -TR      Additional Comments Added to HEP   -TR        User Key  (r) = Recorded By, (t) = Taken By, (c) = Cosigned By    Initials Name Provider Type    TR Radha Knowles ELLEN Silva Physical Therapy Assistant                        Manual Rx (last 36 hours)      Manual Treatments       02/27/18 0915          Manual Rx 1    Manual Rx 1 Location Cervical paraspinals and B UT,LS in hooklying  -TR      Manual Rx 1 Type STM  -TR      Manual Rx 1 Grade min-mod  -TR      Manual Rx 1 Duration 5  -TR      Manual Rx 2    Manual Rx 2 Location Suboccipital releases with upper cervical traction  -TR      Manual Rx 2 Grade min and grade 1 traction  -TR      Manual Rx 2 Duration 5  -TR       Manual Rx 3    Manual Rx 3 Location lower cervical traction in hooklying  -TR      Manual Rx 3 Grade 1-2 sustained  -TR      Manual Rx 3 Duration 5  -TR      Manual Rx 4    Manual Rx 4 Location Thoracic paraspinals and B UT : in prone with pillow under abdomen  -TR      Manual Rx 4 Type STM in prone and with blue ridged foam foller  -TR      Manual Rx 4 Grade min-mod  -TR      Manual Rx 4 Duration 5  -TR      Manual Rx 5    Manual Rx 5 Location Thoracic  in prone with pillow under abdomen  -TR      Manual Rx 5 Type extension mobilization  -TR      Manual Rx 5 Grade 1-2  -TR      Manual Rx 5 Duration 10  -TR        User Key  (r) = Recorded By, (t) = Taken By, (c) = Cosigned By    Initials Name Provider Type    TR Radha Silva, PTA Physical Therapy Assistant                PT OP Goals       02/27/18 0915       PT Short Term Goals    STG Date to Achieve 02/27/18  -TR     STG 1 Pt will demonstrate 45 degress or greater cervical rotation.   -TR     STG 1 Progress Met  -TR     STG 2 Pt will report pain no greater than 4-5/10 with household and community activities.   -TR     STG 2 Progress Ongoing  -TR     STG 2 Progress Comments 5/10 today at rest   -TR     STG 3 Pt will score 15 or less on the NDI.   -TR     STG 3 Progress Ongoing  -TR     Long Term Goals    LTG Date to Achieve 03/20/18  -TR     LTG 1 Pt will demonstrate 65 degress or greater cervical rotaiton in order to be able to drive car.  -TR     LTG 1 Progress Ongoing  -TR     LTG 2 Pt will report pain no greater than 1-2/10 with household and community activities.   -TR     LTG 2 Progress Ongoing  -TR     LTG 3 Pt will demonstrae 40 degress or greater cerivcal extension and 30 degress or greater cervical flexion.   -TR     LTG 3 Progress Ongoing  -TR     LTG 4 Pt will score 5 or less on NDI.   -TR     LTG 4 Progress Ongoing  -TR     Time Calculation    PT Goal Re-Cert Due Date 03/14/18  -TR       User Key  (r) = Recorded By, (t) = Taken By, (c) = Cosigned  By    Initials Name Provider Type    TR Radha Silva PTA Physical Therapy Assistant          Therapy Education  Education Details: Doorway pec stretch and seated BUE phasic with yellow T-band   Given: HEP  Program: New  How Provided: Verbal, Demonstration, Written  Provided to: Patient  Level of Understanding: Verbalized, Demonstrated              Time Calculation:   Start Time: 0915  Stop Time: 1000  Time Calculation (min): 45 min  Total Timed Code Minutes- PT: 45 minute(s)    Therapy Charges for Today     Code Description Service Date Service Provider Modifiers Qty    79522596013 HC PT MANUAL THERAPY EA 15 MIN 2/27/2018 Radha Silva PTA GP 2    88329357978 HC PT THER PROC EA 15 MIN 2/27/2018 Radha Silva PTA GP 1                    Radha Silva PTA  2/27/2018

## 2018-03-01 ENCOUNTER — HOSPITAL ENCOUNTER (OUTPATIENT)
Dept: PHYSICAL THERAPY | Facility: HOSPITAL | Age: 53
Setting detail: THERAPIES SERIES
Discharge: HOME | End: 2018-03-01

## 2018-03-01 DIAGNOSIS — M79.601 RIGHT ARM PAIN: ICD-10-CM

## 2018-03-01 DIAGNOSIS — M54.2 NECK PAIN: Primary | ICD-10-CM

## 2018-03-01 DIAGNOSIS — R29.898 WEAKNESS OF RIGHT UPPER EXTREMITY: ICD-10-CM

## 2018-03-01 PROCEDURE — 97110 THERAPEUTIC EXERCISES: CPT

## 2018-03-01 PROCEDURE — 97140 MANUAL THERAPY 1/> REGIONS: CPT

## 2018-03-01 NOTE — THERAPY TREATMENT NOTE
Outpatient Physical Therapy Ortho Treatment Note  [facility]     Patient Name: Claudette Ray  : 1965  MRN: [id number]  Today's Date: 3/1/2018      Visit Date: 2018    Visit Dx:    ICD-10-CM ICD-9-CM   1. Neck pain M54.2 723.1   2. Right arm pain M79.601 729.5   3. Weakness of right upper extremity R29.898 729.89       Patient Active Problem List   Diagnosis   • Conductive hearing loss in right ear   • Sensorineural hearing loss (SNHL) of both ears   • Tobacco abuse disorder   • Neck pain, chronic   • BMI 20.0-20.9, adult   • Chronic post-traumatic headache, not intractable   • Pain of right upper extremity   • Weakness of right arm        Past Medical History:   Diagnosis Date   • Anxiety    • Depressed    • History of occasional ear infections    • PTSD (post-traumatic stress disorder)         Past Surgical History:   Procedure Laterality Date   • EYE SURGERY     • EYE SURGERY Bilateral     X2   • HERNIA REPAIR     • TYMPANOPLASTY Right     x3 /   • TYMPANOPLASTY Right 2017    Procedure: right tympanoplasty, tympanolysis of adhesions, use of the operative microscope, facial nerve monitoring, tragal cartilaginous grafting;  Surgeon: Delfino Francisco MD;  Location: [facility];  Service:    • WRIST SURGERY                               PT Assessment/Plan       18 1000       PT Assessment    Assessment Comments Pt continues to have pain ranging 5-6 out of 10. Pt's ROM continues to improve and she tolerates progression of exercises well however pain continues to remain the same. Spent less time on manual techniques today and more on scauplar stabilization and postural strengthening. Pt B UT and cervical paraspinal tissue gaurding has imporved overall. Pt exhibits increased weakenss with lower traps and increased fatigue with progression onf exercises. Non new HEP components given due to pt going out of town next week and pt not perfroming preivious exercises. Reviewed HEP and  postural education since pt will not be back for therapy for over a week.   -TR     PT Plan    PT Plan Comments Assess if pt has been performing HEP. Continue to improve cervical ROM and continue with scappular strengthening.   -TR       User Key  (r) = Recorded By, (t) = Taken By, (c) = Cosigned By    Initials Name Provider Type    JAVIER Silva PTA Physical Therapy Assistant                    Exercises       03/01/18 1000          Subjective Comments    Subjective Comments Pt reports increased pain yesterday adn not able to do her exercises  -TR      Subjective Pain    Able to rate subjective pain? yes  -TR      Pre-Treatment Pain Level 6  -TR      Post-Treatment Pain Level 6  -TR      Subjective Pain Comment Neck R>L   -TR      Exercise 2    Exercise Name 2 Towel roll stretch   -TR      Cueing 2 Verbal;Tactile  -TR      Sets 2 1  -TR      Time (Minutes) 2 3  -TR      Exercise 3    Exercise Name 3 Hooklying on towel roll BUE diagonals with Yellow T-band   -TR      Cueing 3 Verbal  -TR      Sets 3 2  -TR      Reps 3 10  -TR      Exercise 4    Exercise Name 4 Hooklying on towel roll B UE horizontal abduction   -TR      Cueing 4 Verbal  -TR      Sets 4 2  -TR      Reps 4 10  -TR      Additional Comments Yellow T-band   -TR      Exercise 5    Exercise Name 5 Prone I's   -TR      Cueing 5 Verbal  -TR      Sets 5 2  -TR      Reps 5 10  -TR      Exercise 6    Exercise Name 6 Prone T's  -TR      Cueing 6 Verbal  -TR      Sets 6 2  -TR      Reps 6 10  -TR      Exercise 7    Exercise Name 7 Wall angels standign at 1/2 foam roller   -TR      Cueing 7 Verbal  -TR      Sets 7 2  -TR      Reps 7 10  -TR      Exercise 8    Exercise Name 8 Reviewed HEP   -TR        User Key  (r) = Recorded By, (t) = Taken By, (c) = Cosigned By    Initials Name Provider Type    JAVIER Silva PTA Physical Therapy Assistant                        Manual Rx (last 36 hours)      Manual Treatments       03/01/18 1000           Manual Rx 2    Manual Rx 2 Location Suboccipital releases with upper cervical traction  -TR      Manual Rx 2 Grade min and grade 1 traction  -TR      Manual Rx 2 Duration 10  -TR      Manual Rx 3    Manual Rx 3 Location lower cervical traction in hooklying  -TR      Manual Rx 3 Grade 1-2 sustained  -TR      Manual Rx 3 Duration 10  -TR        User Key  (r) = Recorded By, (t) = Taken By, (c) = Cosigned By    Initials Name Provider Type    TR Radha Silva PTA Physical Therapy Assistant                PT OP Goals       03/01/18 1000       PT Short Term Goals    STG Date to Achieve 02/27/18  -TR     STG 1 Pt will demonstrate 45 degress or greater cervical rotation.   -TR     STG 1 Progress Met  -TR     STG 2 Pt will report pain no greater than 4-5/10 with household and community activities.   -TR     STG 2 Progress Ongoing  -TR     STG 2 Progress Comments 6/10 today, 8/10 last night   -TR     STG 3 Pt will score 15 or less on the NDI.   -TR     STG 3 Progress Ongoing  -TR     Long Term Goals    LTG Date to Achieve 03/20/18  -TR     LTG 1 Pt will demonstrate 65 degress or greater cervical rotaiton in order to be able to drive car.  -TR     LTG 1 Progress Ongoing  -TR     LTG 2 Pt will report pain no greater than 1-2/10 with household and community activities.   -TR     LTG 2 Progress Ongoing  -TR     LTG 3 Pt will demonstrae 40 degress or greater cerivcal extension and 30 degress or greater cervical flexion.   -TR     LTG 3 Progress Ongoing  -TR     LTG 4 Pt will score 5 or less on NDI.   -TR     LTG 4 Progress Ongoing  -TR     Time Calculation    PT Goal Re-Cert Due Date 03/14/18  -TR       User Key  (r) = Recorded By, (t) = Taken By, (c) = Cosigned By    Initials Name Provider Type    TR Radha Silva PTA Physical Therapy Assistant          Therapy Education  Education Details: reviewed   Given: HEP  Program: Reinforced  How Provided: Verbal, Demonstration  Provided to: Patient  Level of Understanding:  Verbalized              Time Calculation:        Therapy Charges for Today     Code Description Service Date Service Provider Modifiers Qty    24512964688 HC PT MANUAL THERAPY EA 15 MIN 3/1/2018 Radha Silva PTA GP 1    79923996606 HC PT THER PROC EA 15 MIN 3/1/2018 Radha Silva PTA GP 2                    Radha Silva PTA  3/1/2018

## 2018-03-14 ENCOUNTER — HOSPITAL ENCOUNTER (OUTPATIENT)
Dept: PHYSICAL THERAPY | Facility: HOSPITAL | Age: 53
Setting detail: THERAPIES SERIES
Discharge: HOME | End: 2018-03-14

## 2018-03-14 DIAGNOSIS — R29.898 WEAKNESS OF RIGHT UPPER EXTREMITY: ICD-10-CM

## 2018-03-14 DIAGNOSIS — M54.2 NECK PAIN: Primary | ICD-10-CM

## 2018-03-14 DIAGNOSIS — M79.601 RIGHT ARM PAIN: ICD-10-CM

## 2018-03-14 PROCEDURE — 97140 MANUAL THERAPY 1/> REGIONS: CPT

## 2018-03-14 PROCEDURE — 97110 THERAPEUTIC EXERCISES: CPT

## 2018-03-14 NOTE — THERAPY PROGRESS REPORT/RE-CERT
Outpatient Physical Therapy Ortho Progress Note   [place] [facility]     Patient Name: Claudette Ray  : 1965  MRN: [id number]  Today's Date: 3/15/2018      Visit Date: 2018    Visit Dx:    ICD-10-CM ICD-9-CM   1. Neck pain M54.2 723.1   2. Right arm pain M79.601 729.5   3. Weakness of right upper extremity R29.898 729.89       Patient Active Problem List   Diagnosis   • Conductive hearing loss in right ear   • Sensorineural hearing loss (SNHL) of both ears   • Tobacco abuse disorder   • Neck pain, chronic   • BMI 20.0-20.9, adult   • Chronic post-traumatic headache, not intractable   • Pain of right upper extremity   • Weakness of right arm        Past Medical History:   Diagnosis Date   • Anxiety    • Depressed    • History of occasional ear infections    • PTSD (post-traumatic stress disorder)         Past Surgical History:   Procedure Laterality Date   • EYE SURGERY     • EYE SURGERY Bilateral     X2   • HERNIA REPAIR     • TYMPANOPLASTY Right     x3 /   • TYMPANOPLASTY Right 2017    Procedure: right tympanoplasty, tympanolysis of adhesions, use of the operative microscope, facial nerve monitoring, tragal cartilaginous grafting;  Surgeon: Delfino Francisco MD;  Location: [facility];  Service:    • WRIST SURGERY                               PT Assessment/Plan     Row Name 18 1301          PT Assessment    Functional Limitations Limitation in home management;Limitations in community activities;Performance in leisure activities  -KR     Impairments Range of motion;Posture;Pain;Muscle strength;Joint mobility  -KR     Assessment Comments Pt has only met one goal at this time and is partially progressing towards most. Her cervical flexion and extension are only degrees improved from last progress note and her cervical rotation although improved overall has decreased sicne last measured time. Pt was in increased pain today which increased her gaurding directly resulting in limited ROM  upon assesment. Pt's NDI score has improved 1 point since last progress note and only 5 points overall. Todays treatment cotninued to focus on soft tissue work to decreased gaurding as well as manual techniques to improve cervical mobiltiy and ROM. Pt does have decreased symptoms post treatment but reports that they will hit her again by the time she gets home. We are not making alot of progress at this time and we will need to discuss progression of care going forward next visit. Pt does report complaince with HEP and was given cervical flexion and extension ROM with arms unweighted to add to previous exercises.   -TR     Please refer to paper survey for additional self-reported information Yes  -KR     Rehab Potential Good  -KR     Patient/caregiver participated in establishment of treatment plan and goals Yes  -KR     Patient would benefit from skilled therapy intervention Yes  -KR        PT Plan    PT Frequency 1x/week;2x/week;3x/week  -KR     Planned CPT's? PT THER PROC EA 15 MIN: 06529;PT THER ACT EA 15 MIN: 91951;PT MANUAL THERAPY EA 15 MIN: 36299;PT NEUROMUSC RE-EDUCATION EA 15 MIN: 95699;PT GAIT TRAINING EA 15 MIN: 29940;PT HOT OR COLD PACK TREAT MCARE;PT ELECTRICAL STIM UNATTEND: ;PT ELECTRICAL STIM ATTD EA 15 MIN: 22961;PT TRACTION CERVICAL: 78357  -KR     Physical Therapy Interventions (Optional Details) taping;swiss ball techniques;stretching;strengthening;ROM (Range of Motion);postural re-education;patient/family education;neuromuscular re-education;modalities;manual therapy techniques;lumbar stabilization;home exercise program;joint mobilization  -KR     PT Plan Comments Progress as tolerared with more scapular strengthening and improving cervical ROM .  -TR        Clinical Impression    Predicted Duration of Therapy Intervention (days/wks) 4 weeks  -KR       User Key  (r) = Recorded By, (t) = Taken By, (c) = Cosigned By    Initials Name Provider Type    DUANE Lyman, PT DPT Physical  Therapist    JAVIER Radha Eleni Silva PTA Physical Therapy Assistant                    Exercises     Row Name 03/14/18 0931             Subjective Comments    Subjective Comments Pt reports not feeling well today. Pt just got back from trip to [place] and reports not having too much pain durinig trip but it has been worse since she has been back   -TR         Subjective Pain    Able to rate subjective pain? yes  -TR      Pre-Treatment Pain Level 7  -TR      Post-Treatment Pain Level 6  -TR      Subjective Pain Comment Neck   -TR         Exercise 1    Exercise Name 1 Addressed all goals for re-cert   -TR        User Key  (r) = Recorded By, (t) = Taken By, (c) = Cosigned By    Initials Name Provider Type    JAVIER Radha Eleni Silva PTA Physical Therapy Assistant                        Manual Rx (last 36 hours)      Manual Treatments     Row Name 03/14/18 0931             Manual Rx 2    Manual Rx 2 Location Suboccipital releases with upper cervical traction and STM to cervical paraspinals   -TR      Manual Rx 2 Grade min and grade 2 traction  -TR      Manual Rx 2 Duration 10  -TR         Manual Rx 3    Manual Rx 3 Location lower cervical traction in hooklying  -TR      Manual Rx 3 Grade 2sustained  -TR      Manual Rx 3 Duration 10  -TR         Manual Rx 4    Manual Rx 4 Location Upper thoracic paraspinals and B UT in chair   -TR      Manual Rx 4 Type STM   -TR      Manual Rx 4 Grade min-mod  -TR      Manual Rx 4 Duration 10  -TR        User Key  (r) = Recorded By, (t) = Taken By, (c) = Cosigned By    Initials Name Provider Type    JAVIER Knowles ELLEN Silva Physical Therapy Assistant                PT OP Goals     Row Name 03/14/18 0931          PT Short Term Goals    STG Date to Achieve 02/27/18  -TR     STG 1 Pt will demonstrate 45 degress or greater cervical rotation.   -TR     STG 1 Progress Met  -TR     STG 1 Progress Comments 45 degrees bilaterally today with increased gaurding   -TR     STG 2 Pt will report  pain no greater than 4-5/10 with household and community activities.   -TR     STG 2 Progress Ongoing  -TR     STG 2 Progress Comments 7/10 at its worst   -TR     STG 3 Pt will score 15 or less on the NDI.   -TR     STG 3 Progress Ongoing  -TR     STG 3 Progress Comments 24 today   -TR        Long Term Goals    LTG Date to Achieve 03/20/18  -TR     LTG 1 Pt will demonstrate 65 degress or greater cervical rotaiton in order to be able to drive car.  -TR     LTG 1 Progress Ongoing  -TR     LTG 1 Progress Comments 45 degrees bilterally today with increased gaurding   -TR     LTG 2 Pt will report pain no greater than 1-2/10 with household and community activities.   -TR     LTG 2 Progress Ongoing  -TR     LTG 2 Progress Comments 7/10 at its worst   -TR     LTG 3 Pt will demonstrae 40 degress or greater cerivcal extension and 30 degress or greater cervical flexion.   -TR     LTG 3 Progress Ongoing  -TR     LTG 3 Progress Comments extension 22, flexion 20 degrees all AROM   -TR     LTG 4 Pt will score 5 or less on NDI.   -TR     LTG 4 Progress Ongoing  -TR     LTG 4 Progress Comments 24 today   -TR        Time Calculation    PT Goal Re-Cert Due Date 04/13/18  -TR       User Key  (r) = Recorded By, (t) = Taken By, (c) = Cosigned By    Initials Name Provider Type    TR Radha Silva, PTA Physical Therapy Assistant               Outcome Measure Options: Neck Disability Index (NDI)  Neck Disability Index  Section 1 - Pain Intensity: The pain is fairly severe at the moment.  Section 2 - Personal Care: I can look after myself normally, but it causes extra pain.  Section 3 - Lifting: I can lift heavy weights, but it gives me extra pain.  Section 4 - Work: I can't do my usual work.  Section 5 - Headaches: I have moderate headaches that come frequently  Section 6 - Concentration: I have a fair degree of difficulty concentrating.  Section 7 - Sleeping: My sleep is mildly disturbed for up to 1-2 hours.  Section 8 - Driving: I  can't drive as long as I want because of moderate neck pain.  Section 9 - Reading: I can't read as much as I want because of moderate neck pain.  Section 10 - Recreation: I have neck pain with most recreational activities.  Neck Disability Index Score: 24      Time Calculation:   Start Time: 0931  Stop Time: 1015  Time Calculation (min): 44 min  Total Timed Code Minutes- PT: 44 minute(s)        PT G-Codes  Outcome Measure Options: Neck Disability Index (NDI)         Anuradha Lyman, PT DPT  3/15/2018

## 2018-03-19 ENCOUNTER — APPOINTMENT (OUTPATIENT)
Dept: PHYSICAL THERAPY | Facility: HOSPITAL | Age: 53
End: 2018-03-19

## 2018-03-29 ENCOUNTER — HOSPITAL ENCOUNTER (OUTPATIENT)
Dept: PHYSICAL THERAPY | Facility: HOSPITAL | Age: 53
Setting detail: THERAPIES SERIES
Discharge: HOME | End: 2018-03-29

## 2018-03-29 DIAGNOSIS — M54.2 NECK PAIN: Primary | ICD-10-CM

## 2018-03-29 DIAGNOSIS — R29.898 WEAKNESS OF RIGHT UPPER EXTREMITY: ICD-10-CM

## 2018-03-29 DIAGNOSIS — M79.601 RIGHT ARM PAIN: ICD-10-CM

## 2018-03-29 PROCEDURE — 97140 MANUAL THERAPY 1/> REGIONS: CPT | Performed by: PHYSICAL THERAPIST

## 2018-03-29 PROCEDURE — 97110 THERAPEUTIC EXERCISES: CPT | Performed by: PHYSICAL THERAPIST

## 2018-03-29 NOTE — THERAPY TREATMENT NOTE
Outpatient Physical Therapy Ortho Treatment Note  [facility]     Patient Name: Claudette Ray  : 1965  MRN: [id number]  Today's Date: 3/29/2018      Visit Date: 2018    Visit Dx:    ICD-10-CM ICD-9-CM   1. Neck pain M54.2 723.1   2. Right arm pain M79.601 729.5   3. Weakness of right upper extremity R29.898 729.89       Patient Active Problem List   Diagnosis   • Conductive hearing loss in right ear   • Sensorineural hearing loss (SNHL) of both ears   • Tobacco abuse disorder   • Neck pain, chronic   • BMI 20.0-20.9, adult   • Chronic post-traumatic headache, not intractable   • Pain of right upper extremity   • Weakness of right arm        Past Medical History:   Diagnosis Date   • Anxiety    • Depressed    • History of occasional ear infections    • PTSD (post-traumatic stress disorder)         Past Surgical History:   Procedure Laterality Date   • EYE SURGERY     • EYE SURGERY Bilateral     X2   • HERNIA REPAIR     • TYMPANOPLASTY Right     x3 /   • TYMPANOPLASTY Right 2017    Procedure: right tympanoplasty, tympanolysis of adhesions, use of the operative microscope, facial nerve monitoring, tragal cartilaginous grafting;  Surgeon: Delfino Francisco MD;  Location: [facility];  Service:    • WRIST SURGERY                               PT Assessment/Plan     Row Name 18 0741          PT Assessment    Assessment Comments She reports less pain today and slight improvements in her cervicacl ROM. She is still overall limited with full ROM and having increased pain.   -KR        PT Plan    PT Plan Comments We had a small gap in therapy secondary to her traveling and insurance auths. We will see how she progresses in the next couple visits, but if she is still having increeased pain and limited mobiltiy we may need to hold vs d/c therapy. Conitnue to progress scapular strengthening. Add to HEP as able.   -KR       User Key  (r) = Recorded By, (t) = Taken By, (c) = Cosigned By     "Initials Name Provider Type    DUANE Lyman, PT DPT Physical Therapist                    Exercises     Row Name 03/29/18 0741             Subjective Comments    Subjective Comments She repots yesterday was a horrible day, but better today and feels \"good today\".   -KR         Subjective Pain    Able to rate subjective pain? yes  -KR      Pre-Treatment Pain Level 5  -KR      Post-Treatment Pain Level 5  -KR      Subjective Pain Comment neck B  -KR         Exercise 1    Exercise Name 1 cervical retractions with towel roll   -KR      Cueing 1 Verbal;Demo  -KR      Sets 1 3  -KR      Reps 1 15  -KR         Exercise 2    Exercise Name 2 hooklying 1/2 foam thoracic extension stretch  -KR      Cueing 2 Verbal  -KR         Exercise 3    Exercise Name 3 hooklying 1/2 foam B horz abduction with yellow TB   -KR      Cueing 3 Verbal  -KR      Sets 3 3  -KR      Reps 3 10  -KR         Exercise 4    Exercise Name 4 hooklying 1/2 foam diagonals with yellow TB   -KR      Cueing 4 Verbal  -KR      Sets 4 2  -KR      Reps 4 10   each direction  -KR      Time (Seconds) 4 --  -KR         Exercise 5    Exercise Name 5 serratus punches  -KR      Cueing 5 Tactile  -KR      Sets 5 3  -KR      Reps 5 10   each arm  -KR         Exercise 6    Exercise Name 6 Prone I's   -KR      Cueing 6 Verbal  -KR      Sets 6 2  -KR      Reps 6 10  -KR      Cueing 6 Verbal  -KR         Exercise 7    Exercise Name 7 prone t's  -KR      Cueing 7 Verbal  -KR      Sets 7 2  -KR      Reps 7 10  -KR      Cueing 7 Verbal  -KR         Exercise 8    Exercise Name 8 prone W's  -KR      Cueing 8 Verbal  -KR      Sets 8 2  -KR      Reps 8 10   -KR        User Key  (r) = Recorded By, (t) = Taken By, (c) = Cosigned By    Initials Name Provider Type    DUANE Lyman, PT DPT Physical Therapist                        Manual Rx (last 36 hours)      Manual Treatments     Row Name 03/29/18 0741             Manual Rx 2    Manual Rx 2 Location Suboccipital " releases with upper cervical traction and STM to cervical paraspinals   -KR      Manual Rx 2 Grade min-mod  and grade 2 traction  -KR      Manual Rx 2 Duration 10  -KR         Manual Rx 3    Manual Rx 3 Location lower cervical traction in hooklying  -KR      Manual Rx 3 Grade grade 2  -KR      Manual Rx 3 Duration 4  -KR        User Key  (r) = Recorded By, (t) = Taken By, (c) = Cosigned By    Initials Name Provider Type    DUANE Lyman, PT DPT Physical Therapist                PT OP Goals     Row Name 03/29/18 0741          PT Short Term Goals    STG Date to Achieve 02/27/18  -KR     STG 1 Pt will demonstrate 45 degress or greater cervical rotation.   -KR     STG 1 Progress Met  -KR     STG 2 Pt will report pain no greater than 4-5/10 with household and community activities.   -KR     STG 2 Progress Ongoing  -KR     STG 3 Pt will score 15 or less on the NDI.   -KR     STG 3 Progress Ongoing  -KR        Long Term Goals    LTG Date to Achieve 03/20/18  -KR     LTG 1 Pt will demonstrate 65 degress or greater cervical rotaiton in order to be able to drive car.  -KR     LTG 1 Progress Ongoing;Progressing  -KR     LTG 1 Progress Comments R 45 L 50  -KR     LTG 2 Pt will report pain no greater than 1-2/10 with household and community activities.   -KR     LTG 2 Progress Ongoing  -KR     LTG 3 Pt will demonstrae 40 degress or greater cerivcal extension and 30 degress or greater cervical flexion.   -KR     LTG 3 Progress Ongoing;Partially Met;Progressing  -KR     LTG 3 Progress Comments 40 ext 20 flex  -KR     LTG 4 Pt will score 5 or less on NDI.   -KR     LTG 4 Progress Ongoing  -KR        Time Calculation    PT Goal Re-Cert Due Date 04/13/18  -KR       User Key  (r) = Recorded By, (t) = Taken By, (c) = Cosigned By    Initials Name Provider Type    DUANE Lyman, PT DPT Physical Therapist          Therapy Education  Education Details: progress of POC  Given: HEP, Symptoms/condition management  Program:  Reinforced  How Provided: Verbal  Provided to: Patient  Level of Understanding: Verbalized              Time Calculation:   Start Time: 0741  Stop Time: 0827  Time Calculation (min): 46 min  Total Timed Code Minutes- PT: 46 minute(s)    Therapy Charges for Today     Code Description Service Date Service Provider Modifiers Qty    23768833875  PT MANUAL THERAPY EA 15 MIN 3/29/2018 Anuradha Lyman, PT DPT GP 1    [id number]  PT THER PROC EA 15 MIN 3/29/2018 Anuradha Lyman, PT DPT GP 2                    Anuradha Lyman, PT DPT  3/29/2018

## 2018-04-03 ENCOUNTER — HOSPITAL ENCOUNTER (OUTPATIENT)
Dept: PHYSICAL THERAPY | Facility: HOSPITAL | Age: 53
Setting detail: THERAPIES SERIES
Discharge: HOME | End: 2018-04-03

## 2018-04-03 ENCOUNTER — DOCUMENTATION (OUTPATIENT)
Dept: PHYSICAL THERAPY | Facility: HOSPITAL | Age: 53
End: 2018-04-03

## 2018-04-03 DIAGNOSIS — M54.2 NECK PAIN: Primary | ICD-10-CM

## 2018-04-03 DIAGNOSIS — M79.601 RIGHT ARM PAIN: ICD-10-CM

## 2018-04-03 NOTE — TREATMENT PLAN
Pt came in for her treatment today and after receiving her subjective information, she was informed by our  that her insurance has been cancelled.  called to confirm this with insurance and it was terminated on March 31'st, 2018. Pt was given the option to self pay for this treatment; however, she refused at this time. She will be placed on hold for now incase she gets it worked out or picks up new insurance.

## 2018-04-06 ENCOUNTER — APPOINTMENT (OUTPATIENT)
Dept: PHYSICAL THERAPY | Facility: HOSPITAL | Age: 53
End: 2018-04-06

## 2018-04-10 ENCOUNTER — APPOINTMENT (OUTPATIENT)
Dept: PHYSICAL THERAPY | Facility: HOSPITAL | Age: 53
End: 2018-04-10

## 2018-04-18 ENCOUNTER — DOCUMENTATION (OUTPATIENT)
Dept: PHYSICAL THERAPY | Facility: HOSPITAL | Age: 53
End: 2018-04-18

## 2018-04-18 NOTE — THERAPY DISCHARGE NOTE
Outpatient Physical Therapy Discharge Summary         Patient Name: Claudette Ray  : 1965  MRN: [id number]    Today's Date: 2018    Visit Dx:  No diagnosis found.          PT OP Goals     Row Name 18 1200          PT Short Term Goals    STG Date to Achieve 18  -TR     STG 1 Pt will demonstrate 45 degress or greater cervical rotation.   -TR     STG 1 Progress Met  -TR     STG 2 Pt will report pain no greater than 4-5/10 with household and community activities.   -TR     STG 2 Progress Not Met  -TR     STG 3 Pt will score 15 or less on the NDI.   -TR     STG 3 Progress Not Met  -TR        Long Term Goals    LTG Date to Achieve 18  -TR     LTG 1 Pt will demonstrate 65 degress or greater cervical rotaiton in order to be able to drive car.  -TR     LTG 1 Progress Not Met  -TR     LTG 2 Pt will report pain no greater than 1-2/10 with household and community activities.   -TR     LTG 2 Progress Not Met  -TR     LTG 3 Pt will demonstrae 40 degress or greater cerivcal extension and 30 degress or greater cervical flexion.   -TR     LTG 3 Progress Partially Met  -TR     LTG 4 Pt will score 5 or less on NDI.   -TR     LTG 4 Progress Not Met  -TR       User Key  (r) = Recorded By, (t) = Taken By, (c) = Cosigned By    Initials Name Provider Type    JAVIER Silva, PTA Physical Therapy Assistant          OP PT Discharge Summary  Date of Discharge: 18  Reason for Discharge: Unable to pay/insurance denied care  Outcomes Achieved: Patient able to partially acheive established goals  Discharge Destination: Home with home program  Discharge Instructions/Additional Comments: On pt's last visit she was informed that her insurance had dropped her and if she wished to continue, it would be self pay. Pt was unable to pay at the time and reported that she would like to placed on hold for 2 weeks to see if she could get insurance sorted out. She was unable to do so therefore, she is being D/C'd        Time Calculation:                    Radha Silva PTA  4/18/2018

## 2018-06-19 NOTE — PROGRESS NOTES
Lm for pt to return call   Patient Intake Note    Review of Systems  Review of Systems   Constitutional: Negative for chills, fatigue and fever.   HENT:        See HPI   Respiratory: Negative for cough, choking, shortness of breath and wheezing.    Cardiovascular: Negative.    Gastrointestinal: Negative for constipation, diarrhea, nausea and vomiting.   Allergic/Immunologic: Negative for environmental allergies and food allergies.   Neurological: Negative for dizziness, light-headedness and headaches.   Hematological: Does not bruise/bleed easily.   Psychiatric/Behavioral: Negative for sleep disturbance.       QUALITY MEASURES    Body Mass Index Screening and Follow-Up Plan  Body mass index is 21.33 kg/(m^2).      Tobacco Use: Screening and Cessation Intervention  Smoking status: Current Every Day Smoker                                                   Packs/day: 1.00      Years: 0.00         Types: Cigarettes  Smokeless status: Never Used                            Marie Agustin  1/31/2018  8:57 AM

## 2018-07-31 ENCOUNTER — TELEPHONE (OUTPATIENT)
Dept: NEUROSURGERY | Age: 53
End: 2018-07-31

## 2018-08-06 ENCOUNTER — OFFICE VISIT (OUTPATIENT)
Dept: NEUROSURGERY | Facility: CLINIC | Age: 53
End: 2018-08-06

## 2018-08-06 VITALS — HEIGHT: 65 IN | BODY MASS INDEX: 21.99 KG/M2 | WEIGHT: 132 LBS

## 2018-08-06 DIAGNOSIS — G44.329 CHRONIC POST-TRAUMATIC HEADACHE, NOT INTRACTABLE: ICD-10-CM

## 2018-08-06 DIAGNOSIS — G89.29 NECK PAIN, CHRONIC: Primary | ICD-10-CM

## 2018-08-06 DIAGNOSIS — M54.2 NECK PAIN, CHRONIC: Primary | ICD-10-CM

## 2018-08-06 DIAGNOSIS — Z72.0 TOBACCO ABUSE DISORDER: ICD-10-CM

## 2018-08-06 PROCEDURE — 99213 OFFICE O/P EST LOW 20 MIN: CPT | Performed by: NURSE PRACTITIONER

## 2018-08-06 RX ORDER — GABAPENTIN 300 MG/1
CAPSULE ORAL
COMMUNITY
Start: 2018-07-27

## 2018-08-06 NOTE — PROGRESS NOTES
"Neurosurgery Follow Up Office Visit      Patient Name:  Claudette Ray  Age:  52 y.o.  YOB: 1965  MR#:  [id number]    Ht 165.1 cm (65\")   Wt 59.9 kg (132 lb)   BMI 21.97 kg/m²     Social History   Substance Use Topics   • Smoking status: Current Every Day Smoker     Packs/day: 1.00     Types: Cigarettes   • Smokeless tobacco: Never Used   • Alcohol use 1.2 oz/week     2 Glasses of wine per week       Chief Complaint   Patient presents with   • Follow-up     6 month f/u.  Pt states that she is hurting so badly that she feels that something has to be wrong.  She states that she will be going to see another neurosurgeon to try to have surgery.         History  Chief Complaint:  She returns today for 6 month follow-up of chronic neck pain.  She last saw Dr. Hanna in February of this year in follow-up of current physical therapy.  It had not helped her and in fact may have made her worse.  However, she was not complaining of any radicular features into the upper extremities.  He reviewed her imaging results with her, but given her current symptoms, did not recommend surgery at this time.  Rather, he had recommended pain management referral.  The patient states she had been doing a lot of traveling and never made contact with pain management.  In further review, there was an issue with our referral, as the patient has passport which requires and accepts PCP referral only.  She feels that she is still struggling and having a lot of issues.  She is very concerned about bone spurs causing ongoing damage in her neck.  She has requested Dr. Bennett to refer her to a different neurosurgery group for further evaluation and essential second opinion.      Physical Examination:  Upon exam, she looks pretty good.  She is awake and alert, pleasant and cooperative, speech is clear and appropriate.  Skin is warm and dry.  Cervical range of motion is limited and causes discomfort, but is reasonable.  There is generalized " weakness of the upper extremities.  I cannot detect a true, focal weakness.  Deep tendon reflexes diminished bilaterally.  Raj's negative.      Medical Decision Making  Treatment Options:   In the office we have discussed her care.  I have gone back to review her past office visits.  She understands Dr. Hanna's recommendations, but again wishes to see a different neurosurgical group.  We have reviewed referral to pain management and she would still like to try it.  She will contact Dr. Bennett's office to request his referral to the [facility], Dr. Sharp.  From our standpoint, she is not relating any new or different symptoms, in particular, no radicular features.  I have explained that we will follow with her on an as needed basis, and she is agreeable.    Information regarding tobacco cessation, which has been previously discussed,  has again been given in an effort to help support overall health and wellness.      Assessment and Plan  Claudette was seen today for follow-up.    Diagnoses and all orders for this visit:    Neck pain, chronic    Chronic post-traumatic headache, not intractable    BMI 21.0-21.9, adult    Tobacco abuse disorder        Return if symptoms worsen or fail to improve.      Beverly Nunez, APRN

## 2018-08-24 ENCOUNTER — TELEPHONE (OUTPATIENT)
Dept: NEUROSURGERY | Age: 53
End: 2018-08-24

## 2018-08-27 ENCOUNTER — TELEPHONE (OUTPATIENT)
Dept: NEUROSURGERY | Age: 53
End: 2018-08-27

## 2018-09-11 ENCOUNTER — OFFICE VISIT (OUTPATIENT)
Dept: NEUROSURGERY | Age: 53
End: 2018-09-11
Payer: COMMERCIAL

## 2018-09-11 VITALS
HEIGHT: 65 IN | OXYGEN SATURATION: 97 % | HEART RATE: 110 BPM | DIASTOLIC BLOOD PRESSURE: 73 MMHG | WEIGHT: 130 LBS | SYSTOLIC BLOOD PRESSURE: 123 MMHG | BODY MASS INDEX: 21.66 KG/M2

## 2018-09-11 DIAGNOSIS — M48.02 FORAMINAL STENOSIS OF CERVICAL REGION: ICD-10-CM

## 2018-09-11 DIAGNOSIS — M25.511 CHRONIC PAIN OF BOTH SHOULDERS: ICD-10-CM

## 2018-09-11 DIAGNOSIS — M25.512 CHRONIC PAIN OF BOTH SHOULDERS: ICD-10-CM

## 2018-09-11 DIAGNOSIS — G89.29 CHRONIC PAIN OF BOTH SHOULDERS: ICD-10-CM

## 2018-09-11 DIAGNOSIS — M50.30 DDD (DEGENERATIVE DISC DISEASE), CERVICAL: Primary | ICD-10-CM

## 2018-09-11 DIAGNOSIS — M54.2 NECK PAIN: ICD-10-CM

## 2018-09-11 PROCEDURE — 99204 OFFICE O/P NEW MOD 45 MIN: CPT | Performed by: NURSE PRACTITIONER

## 2018-09-11 RX ORDER — CYCLOBENZAPRINE HCL 10 MG
TABLET ORAL
COMMUNITY
End: 2020-05-23 | Stop reason: ALTCHOICE

## 2018-09-11 RX ORDER — GABAPENTIN 300 MG/1
CAPSULE ORAL
COMMUNITY
Start: 2018-09-04 | End: 2020-05-23 | Stop reason: ALTCHOICE

## 2018-09-11 ASSESSMENT — ENCOUNTER SYMPTOMS
NAUSEA: 0
VOMITING: 0
ABDOMINAL PAIN: 0
BLOOD IN STOOL: 0
EYES NEGATIVE: 1
BACK PAIN: 1
CONSTIPATION: 0
RESPIRATORY NEGATIVE: 1
DIARRHEA: 0
HEARTBURN: 1

## 2020-05-23 ENCOUNTER — HOSPITAL ENCOUNTER (EMERGENCY)
Age: 55
Discharge: HOME | End: 2020-05-23
Payer: COMMERCIAL

## 2020-05-23 ENCOUNTER — APPOINTMENT (OUTPATIENT)
Dept: CT IMAGING | Age: 55
End: 2020-05-23
Payer: COMMERCIAL

## 2020-05-23 VITALS
RESPIRATION RATE: 22 BRPM | HEART RATE: 98 BPM | TEMPERATURE: 96.8 F | BODY MASS INDEX: 21.66 KG/M2 | HEIGHT: 65 IN | OXYGEN SATURATION: 98 % | DIASTOLIC BLOOD PRESSURE: 96 MMHG | SYSTOLIC BLOOD PRESSURE: 142 MMHG | WEIGHT: 130 LBS

## 2020-05-23 LAB
ALBUMIN SERPL-MCNC: 4.7 G/DL (ref 3.5–5.2)
ALP BLD-CCNC: 76 U/L (ref 35–104)
ALT SERPL-CCNC: 50 U/L (ref 5–33)
ANION GAP SERPL CALCULATED.3IONS-SCNC: 16 MMOL/L (ref 7–19)
AST SERPL-CCNC: 46 U/L (ref 5–32)
BASOPHILS ABSOLUTE: 0.1 K/UL (ref 0–0.2)
BASOPHILS RELATIVE PERCENT: 0.8 % (ref 0–1)
BILIRUB SERPL-MCNC: 0.3 MG/DL (ref 0.2–1.2)
BUN BLDV-MCNC: 4 MG/DL (ref 6–20)
CALCIUM SERPL-MCNC: 9.3 MG/DL (ref 8.6–10)
CHLORIDE BLD-SCNC: 98 MMOL/L (ref 98–111)
CO2: 25 MMOL/L (ref 22–29)
CREAT SERPL-MCNC: 0.6 MG/DL (ref 0.5–0.9)
EOSINOPHILS ABSOLUTE: 0 K/UL (ref 0–0.6)
EOSINOPHILS RELATIVE PERCENT: 0.7 % (ref 0–5)
GFR NON-AFRICAN AMERICAN: >60
GLUCOSE BLD-MCNC: 93 MG/DL (ref 74–109)
HCT VFR BLD CALC: 40.1 % (ref 37–47)
HEMOGLOBIN: 13.9 G/DL (ref 12–16)
IMMATURE GRANULOCYTES #: 0 K/UL
LYMPHOCYTES ABSOLUTE: 1.7 K/UL (ref 1.1–4.5)
LYMPHOCYTES RELATIVE PERCENT: 27.5 % (ref 20–40)
MCH RBC QN AUTO: 33.8 PG (ref 27–31)
MCHC RBC AUTO-ENTMCNC: 34.7 G/DL (ref 33–37)
MCV RBC AUTO: 97.6 FL (ref 81–99)
MONOCYTES ABSOLUTE: 0.5 K/UL (ref 0–0.9)
MONOCYTES RELATIVE PERCENT: 7.5 % (ref 0–10)
NEUTROPHILS ABSOLUTE: 3.8 K/UL (ref 1.5–7.5)
NEUTROPHILS RELATIVE PERCENT: 63.3 % (ref 50–65)
PDW BLD-RTO: 11.9 % (ref 11.5–14.5)
PLATELET # BLD: 229 K/UL (ref 130–400)
PMV BLD AUTO: 9.4 FL (ref 9.4–12.3)
POTASSIUM SERPL-SCNC: 3.6 MMOL/L (ref 3.5–5)
RBC # BLD: 4.11 M/UL (ref 4.2–5.4)
SODIUM BLD-SCNC: 139 MMOL/L (ref 136–145)
TOTAL PROTEIN: 7.2 G/DL (ref 6.6–8.7)
WBC # BLD: 6 K/UL (ref 4.8–10.8)

## 2020-05-23 PROCEDURE — 6360000002 HC RX W HCPCS: Performed by: NURSE PRACTITIONER

## 2020-05-23 PROCEDURE — 96374 THER/PROPH/DIAG INJ IV PUSH: CPT

## 2020-05-23 PROCEDURE — 99283 EMERGENCY DEPT VISIT LOW MDM: CPT

## 2020-05-23 PROCEDURE — 6370000000 HC RX 637 (ALT 250 FOR IP): Performed by: NURSE PRACTITIONER

## 2020-05-23 PROCEDURE — 2580000003 HC RX 258: Performed by: NURSE PRACTITIONER

## 2020-05-23 PROCEDURE — 36415 COLL VENOUS BLD VENIPUNCTURE: CPT

## 2020-05-23 PROCEDURE — 80053 COMPREHEN METABOLIC PANEL: CPT

## 2020-05-23 PROCEDURE — 70486 CT MAXILLOFACIAL W/O DYE: CPT

## 2020-05-23 PROCEDURE — 85025 COMPLETE CBC W/AUTO DIFF WBC: CPT

## 2020-05-23 RX ORDER — CEPHALEXIN 250 MG/1
500 CAPSULE ORAL ONCE
Status: COMPLETED | OUTPATIENT
Start: 2020-05-23 | End: 2020-05-23

## 2020-05-23 RX ORDER — ONDANSETRON 4 MG/1
4 TABLET, ORALLY DISINTEGRATING ORAL EVERY 8 HOURS PRN
Qty: 10 TABLET | Refills: 0 | Status: SHIPPED | OUTPATIENT
Start: 2020-05-23 | End: 2021-07-04

## 2020-05-23 RX ORDER — 0.9 % SODIUM CHLORIDE 0.9 %
1000 INTRAVENOUS SOLUTION INTRAVENOUS ONCE
Status: COMPLETED | OUTPATIENT
Start: 2020-05-23 | End: 2020-05-23

## 2020-05-23 RX ORDER — SULFAMETHOXAZOLE AND TRIMETHOPRIM 800; 160 MG/1; MG/1
1 TABLET ORAL ONCE
Status: COMPLETED | OUTPATIENT
Start: 2020-05-23 | End: 2020-05-23

## 2020-05-23 RX ORDER — HYDROCODONE BITARTRATE AND ACETAMINOPHEN 5; 325 MG/1; MG/1
1 TABLET ORAL EVERY 6 HOURS PRN
Qty: 12 TABLET | Refills: 0 | Status: SHIPPED | OUTPATIENT
Start: 2020-05-23 | End: 2020-05-26

## 2020-05-23 RX ORDER — MORPHINE SULFATE 4 MG/ML
4 INJECTION, SOLUTION INTRAMUSCULAR; INTRAVENOUS ONCE
Status: COMPLETED | OUTPATIENT
Start: 2020-05-23 | End: 2020-05-23

## 2020-05-23 RX ORDER — SULFAMETHOXAZOLE AND TRIMETHOPRIM 800; 160 MG/1; MG/1
1 TABLET ORAL 2 TIMES DAILY
Qty: 20 TABLET | Refills: 0 | Status: SHIPPED | OUTPATIENT
Start: 2020-05-23 | End: 2020-06-02

## 2020-05-23 RX ORDER — CEPHALEXIN 500 MG/1
500 CAPSULE ORAL 3 TIMES DAILY
Qty: 30 CAPSULE | Refills: 0 | Status: SHIPPED | OUTPATIENT
Start: 2020-05-23 | End: 2020-06-02

## 2020-05-23 RX ADMIN — MORPHINE SULFATE 4 MG: 4 INJECTION INTRAVENOUS at 20:35

## 2020-05-23 RX ADMIN — SULFAMETHOXAZOLE AND TRIMETHOPRIM 1 TABLET: 800; 160 TABLET ORAL at 21:40

## 2020-05-23 RX ADMIN — CEPHALEXIN 500 MG: 250 CAPSULE ORAL at 21:40

## 2020-05-23 RX ADMIN — SODIUM CHLORIDE 1000 ML: 9 INJECTION, SOLUTION INTRAVENOUS at 20:35

## 2020-05-23 ASSESSMENT — ENCOUNTER SYMPTOMS
VOMITING: 1
FACIAL SWELLING: 1

## 2020-05-23 ASSESSMENT — PAIN SCALES - GENERAL: PAINLEVEL_OUTOF10: 8

## 2020-05-24 NOTE — ED PROVIDER NOTES
[facility]  eMERGENCY dEPARTMENT eNCOUnter      Pt Name: Manuel Abdullahi  MRN: [id number]  [place] 1965  Date of evaluation: 5/23/2020  Provider: Mary Kay [address]       Chief Complaint   Patient presents with    Facial Pain     R sided facial pain and swelling, possible dental caries         HISTORY OF PRESENT ILLNESS   (Location/Symptom, Timing/Onset,Context/Setting, Quality, Duration, Modifying Factors, Severity)  Note limiting factors. Romain Drake a 47 y.o. female who presents to the emergency department for evaluation of facial pain. Pt tells me that she has had right sided facial pain and swelling over past 3 days. She tells me that symptoms worsened today associated with subjective fevers and vomiting. She denies facial injury as well as dental pain. She has had a cough over past few days. She tells me that she smokes cigarettes. She denies abdominal pain as well as dysuria. She has had no sick contacts. She tells me that she has been staying home during the pandemic. HPI    Nursing Notes were reviewed. REVIEW OF SYSTEMS    (2-9 systems for level 4, 10 or more for level 5)     Review of Systems   Constitutional: Positive for fever. HENT: Positive for facial swelling. Gastrointestinal: Positive for vomiting. All other systems reviewed and are negative. A complete review of systems was performed and is negative except as noted above in the HPI. PAST MEDICAL HISTORY     Past Medical History:   Diagnosis Date    Depression          SURGICAL HISTORY       Past Surgical History:   Procedure Laterality Date    FRACTURE SURGERY Left     wrist    INNER EAR SURGERY  2000         CURRENT MEDICATIONS       Previous Medications    No medications on file       ALLERGIES     Patient has no known allergies. FAMILY HISTORY     History reviewed. No pertinent family history.        SOCIAL HISTORY       Social History     Socioeconomic History    Marital status: STOP taking these medications    cyclobenzaprine 10 MG tablet  Commonly known as:  FLEXERIL     gabapentin 300 MG capsule  Commonly known as:  NEURONTIN           Where to Get Your Medications      You can get these medications from any pharmacy    Bring a paper prescription for each of these medications  · cephALEXin 500 MG capsule  · HYDROcodone-acetaminophen 5-325 MG per tablet  · ondansetron 4 MG disintegrating tablet  · sulfamethoxazole-trimethoprim 800-160 MG per tablet           (Pleasenote that portions of this note were completed with a voice recognition program.  Efforts were made to edit the dictations but occasionally words are mis-transcribed.)              Aparna Cruz, APRN  05/23/20 [id number]

## 2021-07-04 ENCOUNTER — APPOINTMENT (OUTPATIENT)
Dept: CT IMAGING | Age: 56
End: 2021-07-04
Payer: COMMERCIAL

## 2021-07-04 ENCOUNTER — HOSPITAL ENCOUNTER (EMERGENCY)
Age: 56
Discharge: HOME | End: 2021-07-04
Payer: COMMERCIAL

## 2021-07-04 VITALS
DIASTOLIC BLOOD PRESSURE: 64 MMHG | RESPIRATION RATE: 17 BRPM | WEIGHT: 125 LBS | BODY MASS INDEX: 20.83 KG/M2 | HEART RATE: 64 BPM | TEMPERATURE: 97.9 F | SYSTOLIC BLOOD PRESSURE: 133 MMHG | OXYGEN SATURATION: 98 % | HEIGHT: 65 IN

## 2021-07-04 DIAGNOSIS — Z97.3 USES CONTACT LENSES: ICD-10-CM

## 2021-07-04 DIAGNOSIS — S05.01XA ABRASION OF RIGHT CORNEA, INITIAL ENCOUNTER: Primary | ICD-10-CM

## 2021-07-04 DIAGNOSIS — R51.9 ACUTE NONINTRACTABLE HEADACHE, UNSPECIFIED HEADACHE TYPE: ICD-10-CM

## 2021-07-04 LAB
ALBUMIN SERPL-MCNC: 4.8 G/DL (ref 3.5–5.2)
ALP BLD-CCNC: 78 U/L (ref 35–104)
ALT SERPL-CCNC: 9 U/L (ref 5–33)
ANION GAP SERPL CALCULATED.3IONS-SCNC: 11 MMOL/L (ref 7–19)
AST SERPL-CCNC: 13 U/L (ref 5–32)
BASOPHILS ABSOLUTE: 0.1 K/UL (ref 0–0.2)
BASOPHILS RELATIVE PERCENT: 0.8 % (ref 0–1)
BILIRUB SERPL-MCNC: 0.5 MG/DL (ref 0.2–1.2)
BUN BLDV-MCNC: 16 MG/DL (ref 6–20)
CALCIUM SERPL-MCNC: 9.9 MG/DL (ref 8.6–10)
CHLORIDE BLD-SCNC: 97 MMOL/L (ref 98–111)
CO2: 26 MMOL/L (ref 22–29)
CREAT SERPL-MCNC: 0.7 MG/DL (ref 0.5–0.9)
EOSINOPHILS ABSOLUTE: 0 K/UL (ref 0–0.6)
EOSINOPHILS RELATIVE PERCENT: 0.5 % (ref 0–5)
GFR AFRICAN AMERICAN: >59
GFR NON-AFRICAN AMERICAN: >60
GLUCOSE BLD-MCNC: 107 MG/DL (ref 74–109)
HCT VFR BLD CALC: 41.9 % (ref 37–47)
HEMOGLOBIN: 14.1 G/DL (ref 12–16)
IMMATURE GRANULOCYTES #: 0 K/UL
LYMPHOCYTES ABSOLUTE: 2 K/UL (ref 1.1–4.5)
LYMPHOCYTES RELATIVE PERCENT: 31.6 % (ref 20–40)
MAGNESIUM: 2.1 MG/DL (ref 1.6–2.6)
MCH RBC QN AUTO: 33.1 PG (ref 27–31)
MCHC RBC AUTO-ENTMCNC: 33.7 G/DL (ref 33–37)
MCV RBC AUTO: 98.4 FL (ref 81–99)
MONOCYTES ABSOLUTE: 0.4 K/UL (ref 0–0.9)
MONOCYTES RELATIVE PERCENT: 6.6 % (ref 0–10)
NEUTROPHILS ABSOLUTE: 3.7 K/UL (ref 1.5–7.5)
NEUTROPHILS RELATIVE PERCENT: 60.3 % (ref 50–65)
PDW BLD-RTO: 11.2 % (ref 11.5–14.5)
PLATELET # BLD: 304 K/UL (ref 130–400)
PMV BLD AUTO: 9.5 FL (ref 9.4–12.3)
POTASSIUM REFLEX MAGNESIUM: 3.5 MMOL/L (ref 3.5–5)
RBC # BLD: 4.26 M/UL (ref 4.2–5.4)
SODIUM BLD-SCNC: 134 MMOL/L (ref 136–145)
TOTAL PROTEIN: 7.3 G/DL (ref 6.6–8.7)
WBC # BLD: 6.2 K/UL (ref 4.8–10.8)

## 2021-07-04 PROCEDURE — 99283 EMERGENCY DEPT VISIT LOW MDM: CPT

## 2021-07-04 PROCEDURE — 6360000002 HC RX W HCPCS: Performed by: NURSE PRACTITIONER

## 2021-07-04 PROCEDURE — 36415 COLL VENOUS BLD VENIPUNCTURE: CPT

## 2021-07-04 PROCEDURE — 2580000003 HC RX 258: Performed by: NURSE PRACTITIONER

## 2021-07-04 PROCEDURE — 85025 COMPLETE CBC W/AUTO DIFF WBC: CPT

## 2021-07-04 PROCEDURE — 96374 THER/PROPH/DIAG INJ IV PUSH: CPT

## 2021-07-04 PROCEDURE — 70450 CT HEAD/BRAIN W/O DYE: CPT

## 2021-07-04 PROCEDURE — 96375 TX/PRO/DX INJ NEW DRUG ADDON: CPT

## 2021-07-04 PROCEDURE — 80053 COMPREHEN METABOLIC PANEL: CPT

## 2021-07-04 PROCEDURE — 6370000000 HC RX 637 (ALT 250 FOR IP): Performed by: NURSE PRACTITIONER

## 2021-07-04 PROCEDURE — 83735 ASSAY OF MAGNESIUM: CPT

## 2021-07-04 RX ORDER — PROMETHAZINE HYDROCHLORIDE 25 MG/ML
12.5 INJECTION, SOLUTION INTRAMUSCULAR; INTRAVENOUS ONCE
Status: COMPLETED | OUTPATIENT
Start: 2021-07-04 | End: 2021-07-04

## 2021-07-04 RX ORDER — CIPROFLOXACIN HYDROCHLORIDE 3.5 MG/ML
1 SOLUTION/ DROPS TOPICAL
Status: DISCONTINUED | OUTPATIENT
Start: 2021-07-04 | End: 2021-07-04 | Stop reason: HOSPADM

## 2021-07-04 RX ORDER — KETOROLAC TROMETHAMINE 30 MG/ML
30 INJECTION, SOLUTION INTRAMUSCULAR; INTRAVENOUS ONCE
Status: COMPLETED | OUTPATIENT
Start: 2021-07-04 | End: 2021-07-04

## 2021-07-04 RX ORDER — TETRACAINE HYDROCHLORIDE 5 MG/ML
1 SOLUTION OPHTHALMIC ONCE
Status: COMPLETED | OUTPATIENT
Start: 2021-07-04 | End: 2021-07-04

## 2021-07-04 RX ORDER — HYDROCODONE BITARTRATE AND ACETAMINOPHEN 5; 325 MG/1; MG/1
1 TABLET ORAL EVERY 6 HOURS PRN
Qty: 10 TABLET | Refills: 0 | Status: SHIPPED
Start: 2021-07-04 | End: 2021-07-04 | Stop reason: CLARIF

## 2021-07-04 RX ORDER — CIPROFLOXACIN HYDROCHLORIDE 3.5 MG/ML
1 SOLUTION/ DROPS TOPICAL
Qty: 50 DROP | Refills: 0 | Status: SHIPPED | OUTPATIENT
Start: 2021-07-04 | End: 2021-07-14

## 2021-07-04 RX ORDER — HYDROCODONE BITARTRATE AND ACETAMINOPHEN 5; 325 MG/1; MG/1
1 TABLET ORAL EVERY 6 HOURS PRN
Qty: 10 TABLET | Refills: 0 | Status: SHIPPED | OUTPATIENT
Start: 2021-07-04 | End: 2021-07-07

## 2021-07-04 RX ORDER — 0.9 % SODIUM CHLORIDE 0.9 %
1000 INTRAVENOUS SOLUTION INTRAVENOUS ONCE
Status: COMPLETED | OUTPATIENT
Start: 2021-07-04 | End: 2021-07-04

## 2021-07-04 RX ADMIN — FLUORESCEIN SODIUM 1 MG: 1 STRIP OPHTHALMIC at 13:13

## 2021-07-04 RX ADMIN — TETRACAINE HYDROCHLORIDE 1 DROP: 5 SOLUTION OPHTHALMIC at 13:13

## 2021-07-04 RX ADMIN — SODIUM CHLORIDE 1000 ML: 9 INJECTION, SOLUTION INTRAVENOUS at 13:12

## 2021-07-04 RX ADMIN — KETOROLAC TROMETHAMINE 30 MG: 30 INJECTION, SOLUTION INTRAMUSCULAR at 13:13

## 2021-07-04 RX ADMIN — PROMETHAZINE HYDROCHLORIDE 12.5 MG: 25 INJECTION INTRAMUSCULAR; INTRAVENOUS at 13:13

## 2021-07-04 RX ADMIN — CIPROFLOXACIN 1 DROP: 3 SOLUTION OPHTHALMIC at 13:34

## 2021-07-04 ASSESSMENT — PAIN SCALES - GENERAL
PAINLEVEL_OUTOF10: 4
PAINLEVEL_OUTOF10: 8
PAINLEVEL_OUTOF10: 8
PAINLEVEL_OUTOF10: 6

## 2021-07-04 ASSESSMENT — ENCOUNTER SYMPTOMS
EYE PAIN: 1
EYE DISCHARGE: 1
PHOTOPHOBIA: 1
EYE REDNESS: 1
NAUSEA: 1
VOMITING: 0

## 2021-07-04 NOTE — ED PROVIDER NOTES
[facility] EMERGENCY DEPT  eMERGENCY dEPARTMENT eNCOUnter      Pt Name: Lilliam Tavarez  MRN: [id number]  [place] 1965  Date of evaluation: 7/4/2021  Provider: John Galloway, [address]       Chief Complaint   Patient presents with    Eye Pain     right eye, started friday night    Migraine         HISTORY OF PRESENT ILLNESS   (Location/Symptom, Timing/Onset,Context/Setting, Quality, Duration, Modifying Factors, Severity)  Note limiting factors. Kamar Myers a 54 y.o. female who presents to the emergency department for evaluation of eye pain and headache. Pt tells me that she fell asleep with her contact lens in place developing right eye pain and teary discharge 4 days ago. She tells me that she has had global headache over past 4 days with gradual worsening unimproved with Excedrin migraine medication. She has had nausea without vomiting. She reports chronic problems with neck pain however denies new or worsening neck pain to me. She gives history of infrequent similar past headaches in past. She tells me that she has been using an eye patch due to light sensitivity right eye. HPI    Nursing Notes were reviewed. REVIEW OF SYSTEMS    (2-9 systems for level 4, 10 or more for level 5)     Review of Systems   Constitutional: Negative for fever. Eyes: Positive for photophobia, pain, discharge and redness. Gastrointestinal: Positive for nausea. Negative for vomiting. Musculoskeletal: Positive for neck pain (unchanged from baseline per patient). Neurological: Negative for weakness and numbness. A complete review of systems was performed and is negative except as noted above in the HPI.        PAST MEDICAL HISTORY     Past Medical History:   Diagnosis Date    Depression          SURGICAL HISTORY       Past Surgical History:   Procedure Laterality Date    FRACTURE SURGERY Left     wrist    INNER EAR SURGERY  2000         CURRENT MEDICATIONS       Discharge Medication List as of 7/4/2021 4:01 PM          ALLERGIES     Patient has no known allergies. FAMILY HISTORY     No family history on file. SOCIAL HISTORY       Social History     Socioeconomic History    Marital status:      Spouse name: Not on file    Number of children: Not on file    Years of education: Not on file    Highest education level: Not on file   Occupational History    Not on file   Tobacco Use    Smoking status: Current Every Day Smoker     Packs/day: 1.00     Types: Cigarettes    Smokeless tobacco: Never Used   Vaping Use    Vaping Use: Never assessed   Substance and Sexual Activity    Alcohol use: Yes     Alcohol/week: 3.0 standard drinks     Types: 3 Cans of beer per week     Comment: daily    Drug use: Yes     Types: Marijuana    Sexual activity: Not on file   Other Topics Concern    Not on file   Social History Narrative    Not on file     Social Determinants of Health     Financial Resource Strain:     Difficulty of Paying Living Expenses:    Food Insecurity:     Worried About Running Out of Food in the Last Year:     [address] in the Last Year:    Transportation Needs:     Lack of Transportation (Medical):      Lack of Transportation (Non-Medical):    Physical Activity:     Days of Exercise per Week:     Minutes of Exercise per Session:    Stress:     Feeling of Stress :    Social Connections:     Frequency of Communication with Friends and Family:     Frequency of Social Gatherings with Friends and Family:     Attends [employer] Services:     Active Member of Clubs or Organizations:     Attends Club or Organization Meetings:     Marital Status:    Intimate Partner Violence:     Fear of Current or Ex-Partner:     Emotionally Abused:     Physically Abused:     Sexually Abused:        SCREENINGS             PHYSICAL EXAM    (up to 7 for level 4, 8 or more for level 5)     ED Triage Vitals [07/04/21 1244]   BP Temp Temp src Pulse Resp SpO2 Height Weight   135/68 97.9 °F (36.6 °C) -- 63 18 99 % 5' 5\" (1.651 m) 125 lb (56.7 kg)       Physical Exam  Vitals reviewed. HENT:      Mouth/Throat:      Mouth: Mucous membranes are moist.      Pharynx: Oropharynx is clear. Eyes:      Extraocular Movements: Extraocular movements intact. Pupils: Pupils are equal, round, and reactive to light. Comments: Injected appearance of right bulbar conjunctivae  2gtts tetracaine instilled right eye  Small area of linear uptake of fluorescein at 4 o'clock position without appearance of dendrite  Negative hyphema  Negative Jeannine sign  Irrigated with saline  IOP 13mmHg with tonometer     Neck:      Comments: No nuchal rigidity  Painful active extension of neck  Cardiovascular:      Rate and Rhythm: Normal rate. Pulmonary:      Effort: Pulmonary effort is normal.   Skin:     General: Skin is warm and dry. Neurological:      Mental Status: She is alert and oriented to person, place, and time. DIAGNOSTIC RESULTS     EKG: All EKG's are interpreted by the Emergency Department Physician who either signs or Co-signs this chart in the absence of acardiologist.        RADIOLOGY:   Non-plain film images such as CT, Ultrasound andMRI are read by the radiologist. Plain radiographic images are visualized and preliminarily interpreted by the emergency physician with the below findings:        Interpretation per the Radiologist below, if available at the time of this note:    CT HEAD WO CONTRAST   Final Result   1. No acute intracranial process.    Signed by Dr Paola Webb            ED BEDSIDE ULTRASOUND:   Performed by ED Physician - none    LABS:  Labs Reviewed   CBC WITH AUTO DIFFERENTIAL - Abnormal; Notable for the following components:       Result Value    MCH 33.1 (*)     RDW 11.2 (*)     All other components within normal limits   COMPREHENSIVE METABOLIC PANEL W/ REFLEX TO MG FOR LOW K - Abnormal; Notable for the following components:    Sodium 134 (*)     Chloride 97 (*)     All other to edit the dictations but occasionally words are mis-transcribed.)              STACY Garcia  07/04/21 9257

## 2022-08-02 ENCOUNTER — OFFICE VISIT (OUTPATIENT)
Dept: FAMILY MEDICINE CLINIC | Facility: CLINIC | Age: 57
End: 2022-08-02

## 2022-08-02 VITALS
WEIGHT: 125 LBS | SYSTOLIC BLOOD PRESSURE: 141 MMHG | OXYGEN SATURATION: 99 % | HEIGHT: 65 IN | DIASTOLIC BLOOD PRESSURE: 95 MMHG | BODY MASS INDEX: 20.83 KG/M2 | HEART RATE: 64 BPM

## 2022-08-02 DIAGNOSIS — S02.5XXA CLOSED BROKEN TOOTH WITH COMPLICATION, INITIAL ENCOUNTER: ICD-10-CM

## 2022-08-02 DIAGNOSIS — K04.7 DENTAL ABSCESS: Primary | ICD-10-CM

## 2022-08-02 PROCEDURE — 99204 OFFICE O/P NEW MOD 45 MIN: CPT | Performed by: PEDIATRICS

## 2022-08-02 RX ORDER — TRAMADOL HYDROCHLORIDE 50 MG/1
50 TABLET ORAL EVERY 6 HOURS PRN
Qty: 30 TABLET | Refills: 0 | Status: SHIPPED | OUTPATIENT
Start: 2022-08-02

## 2022-08-02 RX ORDER — CLINDAMYCIN HYDROCHLORIDE 300 MG/1
300 CAPSULE ORAL 3 TIMES DAILY
Qty: 21 CAPSULE | Refills: 0 | Status: SHIPPED | OUTPATIENT
Start: 2022-08-02

## 2022-08-02 NOTE — PROGRESS NOTES
"Chief Complaint  Facial Swelling and Earache    Subjective    History of Present Illness      Patient presents to [facility] for   Patient is here today for a swollen cheek. She stated her dog hitting her face last week and cracked her tooth. Swelling started yesterday. Patient is also complaining of ear pain. Feeling sick to her stomach from the cheek pain.  Level 8.        Review of Systems    I have reviewed and agree with the HPI information as above.  Delfino Bennett MD     Objective   Vital Signs:   /95   Pulse 64   Ht 165.1 cm (65\")   Wt 56.7 kg (125 lb)   SpO2 99%   BMI 20.80 kg/m²     BMI is within normal parameters. No other follow-up for BMI required.      Physical Exam  Vitals and nursing note reviewed.   Constitutional:       Appearance: Normal appearance. She is well-developed.   HENT:      Head: Normocephalic and atraumatic.      Jaw: Tenderness, swelling and pain on movement present.        Right Ear: Tympanic membrane, ear canal and external ear normal.      Left Ear: Tympanic membrane, ear canal and external ear normal.      Nose: Nose normal. No septal deviation, nasal tenderness or congestion.      Mouth/Throat:      Lips: Pink. No lesions.      Mouth: Mucous membranes are moist. No oral lesions.      Dentition: Normal dentition.      Pharynx: Oropharynx is clear. No pharyngeal swelling, oropharyngeal exudate or posterior oropharyngeal erythema.   Eyes:      General: Lids are normal. Vision grossly intact. No scleral icterus.        Right eye: No discharge.         Left eye: No discharge.      Extraocular Movements: Extraocular movements intact.      Conjunctiva/sclera: Conjunctivae normal.      Right eye: Right conjunctiva is not injected.      Left eye: Left conjunctiva is not injected.      Pupils: Pupils are equal, round, and reactive to light.   Neck:      Thyroid: No thyroid mass.      Trachea: Trachea normal.   Cardiovascular:      Rate and Rhythm: " Normal rate and regular rhythm.      Heart sounds: Normal heart sounds. No murmur heard.    No gallop.   Pulmonary:      Effort: Pulmonary effort is normal.      Breath sounds: Normal breath sounds and air entry. No wheezing, rhonchi or rales.   Abdominal:      General: There is no distension.      Palpations: Abdomen is soft. There is no mass.      Tenderness: There is no abdominal tenderness. There is no right CVA tenderness, left CVA tenderness, guarding or rebound.   Musculoskeletal:         General: No tenderness or deformity. Normal range of motion.      Cervical back: Full passive range of motion without pain, normal range of motion and neck supple.      Thoracic back: Normal.      Right lower leg: No edema.      Left lower leg: No edema.   Skin:     General: Skin is warm and dry.      Coloration: Skin is not jaundiced.      Findings: No rash.   Neurological:      Mental Status: She is alert and oriented to person, place, and time.      Cranial Nerves: Cranial nerves are intact.      Sensory: Sensation is intact.      Motor: Motor function is intact.      Coordination: Coordination is intact.      Gait: Gait is intact.      Deep Tendon Reflexes: Reflexes are normal and symmetric.   Psychiatric:         Mood and Affect: Mood and affect normal.         Judgment: Judgment normal.          PHQ-2 Depression Screening  Little interest or pleasure in doing things? 0-->not at all   Feeling down, depressed, or hopeless? 0-->not at all   PHQ-2 Total Score 0     PHQ-9 Depression Screening  Little interest or pleasure in doing things? 0-->not at all   Feeling down, depressed, or hopeless? 0-->not at all   Trouble falling or staying asleep, or sleeping too much?     Feeling tired or having little energy?     Poor appetite or overeating?     Feeling bad about yourself - or that you are a failure or have let yourself or your family down?     Trouble concentrating on things, such as reading the newspaper or watching  television?     Moving or speaking so slowly that other people could have noticed? Or the opposite - being so fidgety or restless that you have been moving around a lot more than usual?     Thoughts that you would be better off dead, or of hurting yourself in some way?     PHQ-9 Total Score 0   If you checked off any problems, how difficult have these problems made it for you to do your work, take care of things at home, or get along with other people?        Result Review  Data Reviewed:                   Assessment and Plan      Problem List Items Addressed This Visit        ENT    Dental abscess - Primary    Current Assessment & Plan     Very swllen right side of face  Extremely tender  Unableto open mouth fully   Will treat with clindamycin something for pain and she will see her dentist tomorrow         Relevant Medications    clindamycin (Cleocin) 300 MG capsule    traMADol (ULTRAM) 50 MG tablet    Closed broken tooth with complication    Current Assessment & Plan     Head bumped with pet dog  Felt tooth crack and 48 hours later swelling tenderness nausea set in  To see dentist tomorrow for evaluation for surgery to drain serious abscess           Relevant Medications    clindamycin (Cleocin) 300 MG capsule    traMADol (ULTRAM) 50 MG tablet              Follow Up   No follow-ups on file.  Patient was given instructions and counseling regarding her condition or for health maintenance advice. Please see specific information pulled into the AVS if appropriate.

## 2022-08-02 NOTE — ASSESSMENT & PLAN NOTE
Very swllen right side of face  Extremely tender  Unableto open mouth fully   Will treat with clindamycin something for pain and she will see her dentist tomorrow

## 2022-08-02 NOTE — ASSESSMENT & PLAN NOTE
Head bumped with pet dog  Felt tooth crack and 48 hours later swelling tenderness nausea set in  To see dentist tomorrow for evaluation for surgery to drain serious abscess

## (undated) DEVICE — 3M™ STERI-STRIP™ REINFORCED ADHESIVE SKIN CLOSURES, R1546, 1/4 IN X 4 IN (6 MM X 100 MM), 10 STRIPS/ENVELOPE: Brand: 3M™ STERI-STRIP™

## (undated) DEVICE — 1010 S-DRAPE TOWEL DRAPE 10/BX: Brand: STERI-DRAPE™

## (undated) DEVICE — HEMOST ABS GZ GELFOAM 12 TO 7MM GELATIN

## (undated) DEVICE — ADHS LIQ MASTISOL 2/3ML

## (undated) DEVICE — SYR CONTRL LUERLOK 10CC

## (undated) DEVICE — SOL IRR BSS 15ML STRL

## (undated) DEVICE — SUT VIC 4/0 P3 18IN UD VCP494H

## (undated) DEVICE — DISPOSABLE BIPOLAR CABLE 12FT. (3.6M): Brand: KIRWAN

## (undated) DEVICE — 3M™ WARMING BLANKET, LOWER BODY, 10 PER CASE, 42568: Brand: BAIR HUGGER™

## (undated) DEVICE — NDL HYPO PRECISIONGLIDE/REG 18G 11/2 PNK

## (undated) DEVICE — GLV SURG BIOGEL M LTX PF 7 1/2

## (undated) DEVICE — CATH IV ANGIOCATH FEP 14GA 1.88IN ORNG

## (undated) DEVICE — 3M™ STERI-DRAPE™ MEDIUM DRAPE WITH APERTURE 1030: Brand: STERI-DRAPE™

## (undated) DEVICE — PK ENT HD AND NK 30

## (undated) DEVICE — SYR TB PRECISIONGLIDE 1CC 26G 3/8IN LF

## (undated) DEVICE — DRSNG TELFA PAD NONADH STR 1S 3X8IN

## (undated) DEVICE — BLD TYMP/STAPE BEAVR BVLDWN 60D 2.5 LF

## (undated) DEVICE — SUT GUT PLN FAST ABS 5/0 PC1 18IN 1915G

## (undated) DEVICE — DRSNG EAR GLASSCOCK A/

## (undated) DEVICE — PUNCH BIOSPY SKIN 4MM

## (undated) DEVICE — PK TURNOVER RM ADV

## (undated) DEVICE — STERILE COTTON BALLS LARGE 5/P: Brand: MEDLINE

## (undated) DEVICE — SYR LL TP 10ML STRL

## (undated) DEVICE — WIPE MEROCEL 3.625X3IN

## (undated) DEVICE — BLD BEAVR MINI RND/TIP GRN

## (undated) DEVICE — SPNG GZ WOVN 4X4IN 12PLY 10/BX STRL

## (undated) DEVICE — MICRO KOVER: Brand: UNBRANDED

## (undated) DEVICE — DRN PENRS 1/4X18IN LTX

## (undated) DEVICE — STANDARD HYPODERMIC NEEDLE,POLYPROPYLENE HUB: Brand: MONOJECT